# Patient Record
Sex: FEMALE | Race: WHITE | NOT HISPANIC OR LATINO | Employment: UNEMPLOYED | URBAN - NONMETROPOLITAN AREA
[De-identification: names, ages, dates, MRNs, and addresses within clinical notes are randomized per-mention and may not be internally consistent; named-entity substitution may affect disease eponyms.]

---

## 2020-08-16 ENCOUNTER — APPOINTMENT (OUTPATIENT)
Dept: CT IMAGING | Facility: HOSPITAL | Age: 55
End: 2020-08-16

## 2020-08-16 ENCOUNTER — APPOINTMENT (OUTPATIENT)
Dept: GENERAL RADIOLOGY | Facility: HOSPITAL | Age: 55
End: 2020-08-16

## 2020-08-16 ENCOUNTER — HOSPITAL ENCOUNTER (OUTPATIENT)
Facility: HOSPITAL | Age: 55
Setting detail: OBSERVATION
Discharge: NURSING, REHAB OR LONG-TERM CARE | End: 2020-08-20
Attending: STUDENT IN AN ORGANIZED HEALTH CARE EDUCATION/TRAINING PROGRAM | Admitting: HOSPITALIST
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
## 2020-08-16 DIAGNOSIS — R07.9 CHEST PAIN, UNSPECIFIED TYPE: Primary | ICD-10-CM

## 2020-08-16 LAB
ALBUMIN SERPL-MCNC: 4.03 G/DL (ref 3.5–5.2)
ALBUMIN/GLOB SERPL: 1.1 G/DL
ALP SERPL-CCNC: 165 U/L (ref 39–117)
ALT SERPL W P-5'-P-CCNC: 32 U/L (ref 1–33)
ANION GAP SERPL CALCULATED.3IONS-SCNC: 12.8 MMOL/L (ref 5–15)
APTT PPP: 28.7 SECONDS (ref 25.6–35.3)
AST SERPL-CCNC: 39 U/L (ref 1–32)
BASOPHILS # BLD AUTO: 0.07 10*3/MM3 (ref 0–0.2)
BASOPHILS NFR BLD AUTO: 0.6 % (ref 0–1.5)
BILIRUB SERPL-MCNC: 0.2 MG/DL (ref 0–1.2)
BUN SERPL-MCNC: 11 MG/DL (ref 6–20)
BUN/CREAT SERPL: 16.4 (ref 7–25)
CALCIUM SPEC-SCNC: 9.2 MG/DL (ref 8.6–10.5)
CHLORIDE SERPL-SCNC: 101 MMOL/L (ref 98–107)
CO2 SERPL-SCNC: 23.2 MMOL/L (ref 22–29)
CREAT SERPL-MCNC: 0.67 MG/DL (ref 0.57–1)
CRP SERPL-MCNC: 4.38 MG/DL (ref 0–0.5)
D-LACTATE SERPL-SCNC: 2.6 MMOL/L (ref 0.5–2)
DEPRECATED RDW RBC AUTO: 43.3 FL (ref 37–54)
EOSINOPHIL # BLD AUTO: 0.3 10*3/MM3 (ref 0–0.4)
EOSINOPHIL NFR BLD AUTO: 2.4 % (ref 0.3–6.2)
ERYTHROCYTE [DISTWIDTH] IN BLOOD BY AUTOMATED COUNT: 12.4 % (ref 12.3–15.4)
GFR SERPL CREATININE-BSD FRML MDRD: 91 ML/MIN/1.73
GLOBULIN UR ELPH-MCNC: 3.7 GM/DL
GLUCOSE SERPL-MCNC: 225 MG/DL (ref 65–99)
HCT VFR BLD AUTO: 43.4 % (ref 34–46.6)
HGB BLD-MCNC: 14.4 G/DL (ref 12–15.9)
HOLD SPECIMEN: NORMAL
IMM GRANULOCYTES # BLD AUTO: 0.12 10*3/MM3 (ref 0–0.05)
IMM GRANULOCYTES NFR BLD AUTO: 0.9 % (ref 0–0.5)
INR PPP: 1 (ref 0.9–1.1)
LYMPHOCYTES # BLD AUTO: 2.1 10*3/MM3 (ref 0.7–3.1)
LYMPHOCYTES NFR BLD AUTO: 16.6 % (ref 19.6–45.3)
MCH RBC QN AUTO: 32.1 PG (ref 26.6–33)
MCHC RBC AUTO-ENTMCNC: 33.2 G/DL (ref 31.5–35.7)
MCV RBC AUTO: 96.7 FL (ref 79–97)
MONOCYTES # BLD AUTO: 0.75 10*3/MM3 (ref 0.1–0.9)
MONOCYTES NFR BLD AUTO: 5.9 % (ref 5–12)
NEUTROPHILS NFR BLD AUTO: 73.6 % (ref 42.7–76)
NEUTROPHILS NFR BLD AUTO: 9.33 10*3/MM3 (ref 1.7–7)
NRBC BLD AUTO-RTO: 0 /100 WBC (ref 0–0.2)
NT-PROBNP SERPL-MCNC: 18.4 PG/ML (ref 0–900)
PHENYTOIN SERPL-MCNC: 17.2 MCG/ML (ref 10–20)
PLATELET # BLD AUTO: 208 10*3/MM3 (ref 140–450)
PMV BLD AUTO: 10.6 FL (ref 6–12)
POTASSIUM SERPL-SCNC: 4.6 MMOL/L (ref 3.5–5.2)
PROT SERPL-MCNC: 7.7 G/DL (ref 6–8.5)
PROTHROMBIN TIME: 13 SECONDS (ref 11.9–14.1)
RBC # BLD AUTO: 4.49 10*6/MM3 (ref 3.77–5.28)
SODIUM SERPL-SCNC: 137 MMOL/L (ref 136–145)
TROPONIN T SERPL-MCNC: <0.01 NG/ML (ref 0–0.03)
WBC # BLD AUTO: 12.67 10*3/MM3 (ref 3.4–10.8)
WHOLE BLOOD HOLD SPECIMEN: NORMAL
WHOLE BLOOD HOLD SPECIMEN: NORMAL

## 2020-08-16 PROCEDURE — 93010 ELECTROCARDIOGRAM REPORT: CPT | Performed by: INTERNAL MEDICINE

## 2020-08-16 PROCEDURE — 96361 HYDRATE IV INFUSION ADD-ON: CPT

## 2020-08-16 PROCEDURE — 80053 COMPREHEN METABOLIC PANEL: CPT | Performed by: STUDENT IN AN ORGANIZED HEALTH CARE EDUCATION/TRAINING PROGRAM

## 2020-08-16 PROCEDURE — 93005 ELECTROCARDIOGRAM TRACING: CPT | Performed by: STUDENT IN AN ORGANIZED HEALTH CARE EDUCATION/TRAINING PROGRAM

## 2020-08-16 PROCEDURE — 36415 COLL VENOUS BLD VENIPUNCTURE: CPT

## 2020-08-16 PROCEDURE — 25010000002 ONDANSETRON PER 1 MG: Performed by: STUDENT IN AN ORGANIZED HEALTH CARE EDUCATION/TRAINING PROGRAM

## 2020-08-16 PROCEDURE — 85025 COMPLETE CBC W/AUTO DIFF WBC: CPT | Performed by: STUDENT IN AN ORGANIZED HEALTH CARE EDUCATION/TRAINING PROGRAM

## 2020-08-16 PROCEDURE — 85379 FIBRIN DEGRADATION QUANT: CPT | Performed by: STUDENT IN AN ORGANIZED HEALTH CARE EDUCATION/TRAINING PROGRAM

## 2020-08-16 PROCEDURE — 86140 C-REACTIVE PROTEIN: CPT | Performed by: STUDENT IN AN ORGANIZED HEALTH CARE EDUCATION/TRAINING PROGRAM

## 2020-08-16 PROCEDURE — 83605 ASSAY OF LACTIC ACID: CPT | Performed by: STUDENT IN AN ORGANIZED HEALTH CARE EDUCATION/TRAINING PROGRAM

## 2020-08-16 PROCEDURE — 72125 CT NECK SPINE W/O DYE: CPT

## 2020-08-16 PROCEDURE — 80185 ASSAY OF PHENYTOIN TOTAL: CPT | Performed by: STUDENT IN AN ORGANIZED HEALTH CARE EDUCATION/TRAINING PROGRAM

## 2020-08-16 PROCEDURE — 0 IOVERSOL 68 % SOLUTION: Performed by: STUDENT IN AN ORGANIZED HEALTH CARE EDUCATION/TRAINING PROGRAM

## 2020-08-16 PROCEDURE — 71045 X-RAY EXAM CHEST 1 VIEW: CPT

## 2020-08-16 PROCEDURE — 99285 EMERGENCY DEPT VISIT HI MDM: CPT

## 2020-08-16 PROCEDURE — 70450 CT HEAD/BRAIN W/O DYE: CPT

## 2020-08-16 PROCEDURE — 87040 BLOOD CULTURE FOR BACTERIA: CPT | Performed by: STUDENT IN AN ORGANIZED HEALTH CARE EDUCATION/TRAINING PROGRAM

## 2020-08-16 PROCEDURE — 85730 THROMBOPLASTIN TIME PARTIAL: CPT | Performed by: STUDENT IN AN ORGANIZED HEALTH CARE EDUCATION/TRAINING PROGRAM

## 2020-08-16 PROCEDURE — 96375 TX/PRO/DX INJ NEW DRUG ADDON: CPT

## 2020-08-16 PROCEDURE — 83880 ASSAY OF NATRIURETIC PEPTIDE: CPT | Performed by: STUDENT IN AN ORGANIZED HEALTH CARE EDUCATION/TRAINING PROGRAM

## 2020-08-16 PROCEDURE — 84484 ASSAY OF TROPONIN QUANT: CPT | Performed by: STUDENT IN AN ORGANIZED HEALTH CARE EDUCATION/TRAINING PROGRAM

## 2020-08-16 PROCEDURE — 74160 CT ABDOMEN W/CONTRAST: CPT

## 2020-08-16 PROCEDURE — 85610 PROTHROMBIN TIME: CPT | Performed by: STUDENT IN AN ORGANIZED HEALTH CARE EDUCATION/TRAINING PROGRAM

## 2020-08-16 RX ORDER — ONDANSETRON 2 MG/ML
4 INJECTION INTRAMUSCULAR; INTRAVENOUS ONCE
Status: COMPLETED | OUTPATIENT
Start: 2020-08-16 | End: 2020-08-16

## 2020-08-16 RX ORDER — ONDANSETRON 4 MG/1
4 TABLET, ORALLY DISINTEGRATING ORAL ONCE
Status: DISCONTINUED | OUTPATIENT
Start: 2020-08-16 | End: 2020-08-16

## 2020-08-16 RX ORDER — SODIUM CHLORIDE 0.9 % (FLUSH) 0.9 %
10 SYRINGE (ML) INJECTION AS NEEDED
Status: DISCONTINUED | OUTPATIENT
Start: 2020-08-16 | End: 2020-08-20 | Stop reason: HOSPADM

## 2020-08-16 RX ORDER — ASPIRIN 81 MG/1
324 TABLET, CHEWABLE ORAL ONCE
Status: COMPLETED | OUTPATIENT
Start: 2020-08-16 | End: 2020-08-16

## 2020-08-16 RX ADMIN — ASPIRIN 324 MG: 81 TABLET, CHEWABLE ORAL at 21:53

## 2020-08-16 RX ADMIN — ONDANSETRON 4 MG: 2 INJECTION INTRAMUSCULAR; INTRAVENOUS at 22:30

## 2020-08-16 RX ADMIN — IOVERSOL 90 ML: 678 INJECTION INTRA-ARTERIAL; INTRAVENOUS at 23:32

## 2020-08-16 RX ADMIN — SODIUM CHLORIDE 1000 ML: 9 INJECTION, SOLUTION INTRAVENOUS at 23:50

## 2020-08-17 ENCOUNTER — APPOINTMENT (OUTPATIENT)
Dept: NUCLEAR MEDICINE | Facility: HOSPITAL | Age: 55
End: 2020-08-17

## 2020-08-17 ENCOUNTER — APPOINTMENT (OUTPATIENT)
Dept: CARDIOLOGY | Facility: HOSPITAL | Age: 55
End: 2020-08-17

## 2020-08-17 PROBLEM — R07.9 CHEST PAIN: Status: ACTIVE | Noted: 2020-08-17

## 2020-08-17 PROBLEM — F79 INTELLECTUAL DISABILITY: Chronic | Status: ACTIVE | Noted: 2020-08-17

## 2020-08-17 PROBLEM — Z86.73 HISTORY OF CVA (CEREBROVASCULAR ACCIDENT): Chronic | Status: ACTIVE | Noted: 2020-08-17

## 2020-08-17 PROBLEM — Z87.898 HISTORY OF SEIZURES: Chronic | Status: ACTIVE | Noted: 2020-08-17

## 2020-08-17 LAB
6-ACETYL MORPHINE: NEGATIVE
ALBUMIN SERPL-MCNC: 4 G/DL (ref 3.5–5.2)
ALBUMIN/GLOB SERPL: 1.3 G/DL
ALP SERPL-CCNC: 158 U/L (ref 39–117)
ALT SERPL W P-5'-P-CCNC: 30 U/L (ref 1–33)
AMPHET+METHAMPHET UR QL: NEGATIVE
ANION GAP SERPL CALCULATED.3IONS-SCNC: 10.5 MMOL/L (ref 5–15)
AST SERPL-CCNC: 20 U/L (ref 1–32)
BACTERIA UR QL AUTO: ABNORMAL /HPF
BARBITURATES UR QL SCN: NEGATIVE
BASOPHILS # BLD AUTO: 0.07 10*3/MM3 (ref 0–0.2)
BASOPHILS NFR BLD AUTO: 0.7 % (ref 0–1.5)
BENZODIAZ UR QL SCN: POSITIVE
BH CV ECHO MEAS - % IVS THICK: 1.1 %
BH CV ECHO MEAS - % LVPW THICK: 13.1 %
BH CV ECHO MEAS - ACS: 2.7 CM
BH CV ECHO MEAS - AO MAX PG: 8.5 MMHG
BH CV ECHO MEAS - AO MEAN PG: 4 MMHG
BH CV ECHO MEAS - AO ROOT AREA (BSA CORRECTED): 1.9
BH CV ECHO MEAS - AO ROOT AREA: 10.2 CM^2
BH CV ECHO MEAS - AO ROOT DIAM: 3.6 CM
BH CV ECHO MEAS - AO V2 MAX: 146 CM/SEC
BH CV ECHO MEAS - AO V2 MEAN: 95.1 CM/SEC
BH CV ECHO MEAS - AO V2 VTI: 28.3 CM
BH CV ECHO MEAS - BSA(HAYCOCK): 2 M^2
BH CV ECHO MEAS - BSA: 1.9 M^2
BH CV ECHO MEAS - BZI_BMI: 38.9 KILOGRAMS/M^2
BH CV ECHO MEAS - BZI_METRIC_HEIGHT: 152.4 CM
BH CV ECHO MEAS - BZI_METRIC_WEIGHT: 90.3 KG
BH CV ECHO MEAS - EDV(CUBED): 48 ML
BH CV ECHO MEAS - EDV(MOD-SP4): 36.8 ML
BH CV ECHO MEAS - EDV(TEICH): 55.7 ML
BH CV ECHO MEAS - EF(CUBED): 65.9 %
BH CV ECHO MEAS - EF(MOD-SP4): 64.4 %
BH CV ECHO MEAS - EF(TEICH): 58.3 %
BH CV ECHO MEAS - ESV(CUBED): 16.4 ML
BH CV ECHO MEAS - ESV(MOD-SP4): 13.1 ML
BH CV ECHO MEAS - ESV(TEICH): 23.2 ML
BH CV ECHO MEAS - FS: 30.1 %
BH CV ECHO MEAS - IVS/LVPW: 1.1
BH CV ECHO MEAS - IVSD: 1.1 CM
BH CV ECHO MEAS - IVSS: 1.1 CM
BH CV ECHO MEAS - LA DIMENSION: 3 CM
BH CV ECHO MEAS - LA/AO: 0.83
BH CV ECHO MEAS - LV DIASTOLIC VOL/BSA (35-75): 19.8 ML/M^2
BH CV ECHO MEAS - LV MASS(C)D: 115.7 GRAMS
BH CV ECHO MEAS - LV MASS(C)DI: 62.1 GRAMS/M^2
BH CV ECHO MEAS - LV MASS(C)S: 76.7 GRAMS
BH CV ECHO MEAS - LV MASS(C)SI: 41.2 GRAMS/M^2
BH CV ECHO MEAS - LV SYSTOLIC VOL/BSA (12-30): 7 ML/M^2
BH CV ECHO MEAS - LVIDD: 3.6 CM
BH CV ECHO MEAS - LVIDS: 2.5 CM
BH CV ECHO MEAS - LVLD AP4: 6.4 CM
BH CV ECHO MEAS - LVLS AP4: 5.6 CM
BH CV ECHO MEAS - LVOT AREA (M): 3.5 CM^2
BH CV ECHO MEAS - LVOT AREA: 3.5 CM^2
BH CV ECHO MEAS - LVOT DIAM: 2.1 CM
BH CV ECHO MEAS - LVPWD: 1 CM
BH CV ECHO MEAS - LVPWS: 1.1 CM
BH CV ECHO MEAS - MV A MAX VEL: 113 CM/SEC
BH CV ECHO MEAS - MV E MAX VEL: 68.6 CM/SEC
BH CV ECHO MEAS - MV E/A: 0.61
BH CV ECHO MEAS - PA ACC TIME: 0.09 SEC
BH CV ECHO MEAS - PA PR(ACCEL): 37.6 MMHG
BH CV ECHO MEAS - SI(AO): 154.7 ML/M^2
BH CV ECHO MEAS - SI(CUBED): 17 ML/M^2
BH CV ECHO MEAS - SI(MOD-SP4): 12.7 ML/M^2
BH CV ECHO MEAS - SI(TEICH): 17.4 ML/M^2
BH CV ECHO MEAS - SV(AO): 288.1 ML
BH CV ECHO MEAS - SV(CUBED): 31.6 ML
BH CV ECHO MEAS - SV(MOD-SP4): 23.7 ML
BH CV ECHO MEAS - SV(TEICH): 32.5 ML
BILIRUB SERPL-MCNC: 0.3 MG/DL (ref 0–1.2)
BILIRUB UR QL STRIP: NEGATIVE
BUN SERPL-MCNC: 9 MG/DL (ref 6–20)
BUN/CREAT SERPL: 16.4 (ref 7–25)
BUPRENORPHINE SERPL-MCNC: NEGATIVE NG/ML
CALCIUM SPEC-SCNC: 8.8 MG/DL (ref 8.6–10.5)
CANNABINOIDS SERPL QL: NEGATIVE
CHLORIDE SERPL-SCNC: 106 MMOL/L (ref 98–107)
CHOLEST SERPL-MCNC: 164 MG/DL (ref 0–200)
CLARITY UR: ABNORMAL
CO2 SERPL-SCNC: 24.5 MMOL/L (ref 22–29)
COCAINE UR QL: NEGATIVE
COLOR UR: YELLOW
CREAT SERPL-MCNC: 0.55 MG/DL (ref 0.57–1)
CRP SERPL-MCNC: 3.76 MG/DL (ref 0–0.5)
D DIMER PPP FEU-MCNC: 0.34 MCGFEU/ML (ref 0–0.5)
D-LACTATE SERPL-SCNC: 1.2 MMOL/L (ref 0.5–2)
DEPRECATED RDW RBC AUTO: 45.7 FL (ref 37–54)
EOSINOPHIL # BLD AUTO: 0.29 10*3/MM3 (ref 0–0.4)
EOSINOPHIL NFR BLD AUTO: 2.9 % (ref 0.3–6.2)
ERYTHROCYTE [DISTWIDTH] IN BLOOD BY AUTOMATED COUNT: 12.5 % (ref 12.3–15.4)
GFR SERPL CREATININE-BSD FRML MDRD: 115 ML/MIN/1.73
GLOBULIN UR ELPH-MCNC: 3.2 GM/DL
GLUCOSE SERPL-MCNC: 166 MG/DL (ref 65–99)
GLUCOSE UR STRIP-MCNC: NEGATIVE MG/DL
HBA1C MFR BLD: 6.8 % (ref 4.8–5.6)
HCT VFR BLD AUTO: 43.5 % (ref 34–46.6)
HDLC SERPL-MCNC: 59 MG/DL (ref 40–60)
HGB BLD-MCNC: 14 G/DL (ref 12–15.9)
HGB UR QL STRIP.AUTO: NEGATIVE
HYALINE CASTS UR QL AUTO: ABNORMAL /LPF
IMM GRANULOCYTES # BLD AUTO: 0.08 10*3/MM3 (ref 0–0.05)
IMM GRANULOCYTES NFR BLD AUTO: 0.8 % (ref 0–0.5)
KETONES UR QL STRIP: NEGATIVE
LACTATE HOLD SPECIMEN: NORMAL
LDLC SERPL CALC-MCNC: 83 MG/DL (ref 0–100)
LDLC/HDLC SERPL: 1.41 {RATIO}
LEUKOCYTE ESTERASE UR QL STRIP.AUTO: ABNORMAL
LYMPHOCYTES # BLD AUTO: 2.55 10*3/MM3 (ref 0.7–3.1)
LYMPHOCYTES NFR BLD AUTO: 25.7 % (ref 19.6–45.3)
MAGNESIUM SERPL-MCNC: 1.9 MG/DL (ref 1.6–2.6)
MAXIMAL PREDICTED HEART RATE: 165 BPM
MCH RBC QN AUTO: 32.1 PG (ref 26.6–33)
MCHC RBC AUTO-ENTMCNC: 32.2 G/DL (ref 31.5–35.7)
MCV RBC AUTO: 99.8 FL (ref 79–97)
METHADONE UR QL SCN: NEGATIVE
MONOCYTES # BLD AUTO: 0.76 10*3/MM3 (ref 0.1–0.9)
MONOCYTES NFR BLD AUTO: 7.7 % (ref 5–12)
NEUTROPHILS NFR BLD AUTO: 6.18 10*3/MM3 (ref 1.7–7)
NEUTROPHILS NFR BLD AUTO: 62.2 % (ref 42.7–76)
NITRITE UR QL STRIP: NEGATIVE
NRBC BLD AUTO-RTO: 0 /100 WBC (ref 0–0.2)
OPIATES UR QL: NEGATIVE
OXYCODONE UR QL SCN: NEGATIVE
PCP UR QL SCN: NEGATIVE
PH UR STRIP.AUTO: 8 [PH] (ref 5–8)
PHOSPHATE SERPL-MCNC: 3.3 MG/DL (ref 2.5–4.5)
PLATELET # BLD AUTO: 194 10*3/MM3 (ref 140–450)
PMV BLD AUTO: 10.9 FL (ref 6–12)
POTASSIUM SERPL-SCNC: 3.9 MMOL/L (ref 3.5–5.2)
PROT SERPL-MCNC: 7.2 G/DL (ref 6–8.5)
PROT UR QL STRIP: NEGATIVE
RBC # BLD AUTO: 4.36 10*6/MM3 (ref 3.77–5.28)
RBC # UR: ABNORMAL /HPF
REF LAB TEST METHOD: ABNORMAL
SODIUM SERPL-SCNC: 141 MMOL/L (ref 136–145)
SP GR UR STRIP: 1.01 (ref 1–1.03)
SQUAMOUS #/AREA URNS HPF: ABNORMAL /HPF
STRESS TARGET HR: 140 BPM
T4 FREE SERPL-MCNC: 1.18 NG/DL (ref 0.93–1.7)
TRIGL SERPL-MCNC: 110 MG/DL (ref 0–150)
TROPONIN T SERPL-MCNC: <0.01 NG/ML (ref 0–0.03)
TSH SERPL DL<=0.05 MIU/L-ACNC: 4.24 UIU/ML (ref 0.27–4.2)
UROBILINOGEN UR QL STRIP: ABNORMAL
VLDLC SERPL-MCNC: 22 MG/DL
WBC # BLD AUTO: 9.93 10*3/MM3 (ref 3.4–10.8)
WBC CLUMPS # UR AUTO: ABNORMAL /HPF
WBC UR QL AUTO: ABNORMAL /HPF

## 2020-08-17 PROCEDURE — 80307 DRUG TEST PRSMV CHEM ANLYZR: CPT | Performed by: PHYSICIAN ASSISTANT

## 2020-08-17 PROCEDURE — G0378 HOSPITAL OBSERVATION PER HR: HCPCS

## 2020-08-17 PROCEDURE — 83735 ASSAY OF MAGNESIUM: CPT | Performed by: PHYSICIAN ASSISTANT

## 2020-08-17 PROCEDURE — 93017 CV STRESS TEST TRACING ONLY: CPT

## 2020-08-17 PROCEDURE — 83605 ASSAY OF LACTIC ACID: CPT | Performed by: STUDENT IN AN ORGANIZED HEALTH CARE EDUCATION/TRAINING PROGRAM

## 2020-08-17 PROCEDURE — 93306 TTE W/DOPPLER COMPLETE: CPT | Performed by: INTERNAL MEDICINE

## 2020-08-17 PROCEDURE — 87077 CULTURE AEROBIC IDENTIFY: CPT | Performed by: HOSPITALIST

## 2020-08-17 PROCEDURE — 97162 PT EVAL MOD COMPLEX 30 MIN: CPT

## 2020-08-17 PROCEDURE — 93306 TTE W/DOPPLER COMPLETE: CPT

## 2020-08-17 PROCEDURE — 78452 HT MUSCLE IMAGE SPECT MULT: CPT | Performed by: INTERNAL MEDICINE

## 2020-08-17 PROCEDURE — 80061 LIPID PANEL: CPT | Performed by: INTERNAL MEDICINE

## 2020-08-17 PROCEDURE — 83036 HEMOGLOBIN GLYCOSYLATED A1C: CPT | Performed by: INTERNAL MEDICINE

## 2020-08-17 PROCEDURE — 84100 ASSAY OF PHOSPHORUS: CPT | Performed by: PHYSICIAN ASSISTANT

## 2020-08-17 PROCEDURE — A9500 TC99M SESTAMIBI: HCPCS | Performed by: HOSPITALIST

## 2020-08-17 PROCEDURE — 81001 URINALYSIS AUTO W/SCOPE: CPT | Performed by: HOSPITALIST

## 2020-08-17 PROCEDURE — 80053 COMPREHEN METABOLIC PANEL: CPT | Performed by: INTERNAL MEDICINE

## 2020-08-17 PROCEDURE — 96367 TX/PROPH/DG ADDL SEQ IV INF: CPT

## 2020-08-17 PROCEDURE — 93010 ELECTROCARDIOGRAM REPORT: CPT | Performed by: INTERNAL MEDICINE

## 2020-08-17 PROCEDURE — 0 TECHNETIUM SESTAMIBI: Performed by: HOSPITALIST

## 2020-08-17 PROCEDURE — 84484 ASSAY OF TROPONIN QUANT: CPT | Performed by: STUDENT IN AN ORGANIZED HEALTH CARE EDUCATION/TRAINING PROGRAM

## 2020-08-17 PROCEDURE — 86140 C-REACTIVE PROTEIN: CPT | Performed by: PHYSICIAN ASSISTANT

## 2020-08-17 PROCEDURE — 99220 PR INITIAL OBSERVATION CARE/DAY 70 MINUTES: CPT | Performed by: PHYSICIAN ASSISTANT

## 2020-08-17 PROCEDURE — 85025 COMPLETE CBC W/AUTO DIFF WBC: CPT | Performed by: INTERNAL MEDICINE

## 2020-08-17 PROCEDURE — 25010000002 REGADENOSON 0.4 MG/5ML SOLUTION: Performed by: INTERNAL MEDICINE

## 2020-08-17 PROCEDURE — 84443 ASSAY THYROID STIM HORMONE: CPT | Performed by: PHYSICIAN ASSISTANT

## 2020-08-17 PROCEDURE — 84484 ASSAY OF TROPONIN QUANT: CPT | Performed by: INTERNAL MEDICINE

## 2020-08-17 PROCEDURE — 25010000002 CEFTRIAXONE PER 250 MG: Performed by: HOSPITALIST

## 2020-08-17 PROCEDURE — 87086 URINE CULTURE/COLONY COUNT: CPT | Performed by: HOSPITALIST

## 2020-08-17 PROCEDURE — 78452 HT MUSCLE IMAGE SPECT MULT: CPT

## 2020-08-17 PROCEDURE — 25010000002 MAGNESIUM SULFATE IN D5W 1G/100ML (PREMIX) 1-5 GM/100ML-% SOLUTION: Performed by: HOSPITALIST

## 2020-08-17 PROCEDURE — 93018 CV STRESS TEST I&R ONLY: CPT | Performed by: INTERNAL MEDICINE

## 2020-08-17 PROCEDURE — 87186 SC STD MICRODIL/AGAR DIL: CPT | Performed by: HOSPITALIST

## 2020-08-17 PROCEDURE — 93005 ELECTROCARDIOGRAM TRACING: CPT | Performed by: INTERNAL MEDICINE

## 2020-08-17 PROCEDURE — 84439 ASSAY OF FREE THYROXINE: CPT | Performed by: HOSPITALIST

## 2020-08-17 PROCEDURE — 96361 HYDRATE IV INFUSION ADD-ON: CPT

## 2020-08-17 PROCEDURE — 96365 THER/PROPH/DIAG IV INF INIT: CPT

## 2020-08-17 PROCEDURE — 97166 OT EVAL MOD COMPLEX 45 MIN: CPT

## 2020-08-17 RX ORDER — RUFINAMIDE 400 MG/1
400 TABLET, FILM COATED ORAL 2 TIMES DAILY
Status: DISCONTINUED | OUTPATIENT
Start: 2020-08-17 | End: 2020-08-17 | Stop reason: ALTCHOICE

## 2020-08-17 RX ORDER — RAMIPRIL 5 MG/1
5 CAPSULE ORAL NIGHTLY
COMMUNITY
End: 2020-08-20 | Stop reason: HOSPADM

## 2020-08-17 RX ORDER — QUETIAPINE FUMARATE 100 MG/1
200 TABLET, FILM COATED ORAL NIGHTLY
Status: DISCONTINUED | OUTPATIENT
Start: 2020-08-17 | End: 2020-08-20 | Stop reason: HOSPADM

## 2020-08-17 RX ORDER — BISMUTH SUBSALICYLATE 262 MG/1
524 TABLET, CHEWABLE ORAL
Status: DISCONTINUED | OUTPATIENT
Start: 2020-08-17 | End: 2020-08-20 | Stop reason: HOSPADM

## 2020-08-17 RX ORDER — NITROGLYCERIN 0.4 MG/1
0.4 TABLET SUBLINGUAL
Status: DISCONTINUED | OUTPATIENT
Start: 2020-08-17 | End: 2020-08-20 | Stop reason: HOSPADM

## 2020-08-17 RX ORDER — POTASSIUM CHLORIDE 20 MEQ/1
20 TABLET, EXTENDED RELEASE ORAL 2 TIMES DAILY
Status: ON HOLD | COMMUNITY
End: 2020-08-20 | Stop reason: SDUPTHER

## 2020-08-17 RX ORDER — LOPERAMIDE HYDROCHLORIDE 2 MG/1
2 CAPSULE ORAL EVERY 6 HOURS PRN
Status: DISCONTINUED | OUTPATIENT
Start: 2020-08-17 | End: 2020-08-20 | Stop reason: HOSPADM

## 2020-08-17 RX ORDER — DIAZEPAM 5 MG/1
5 TABLET ORAL 2 TIMES DAILY
Status: DISCONTINUED | OUTPATIENT
Start: 2020-08-17 | End: 2020-08-20 | Stop reason: HOSPADM

## 2020-08-17 RX ORDER — QUETIAPINE FUMARATE 100 MG/1
100 TABLET, FILM COATED ORAL 2 TIMES DAILY
Status: DISCONTINUED | OUTPATIENT
Start: 2020-08-17 | End: 2020-08-20 | Stop reason: HOSPADM

## 2020-08-17 RX ORDER — L.ACID,PARA/B.BIFIDUM/S.THERM 8B CELL
1 CAPSULE ORAL DAILY
Status: DISCONTINUED | OUTPATIENT
Start: 2020-08-18 | End: 2020-08-20 | Stop reason: HOSPADM

## 2020-08-17 RX ORDER — ASPIRIN 81 MG/1
81 TABLET, CHEWABLE ORAL DAILY
Status: DISCONTINUED | OUTPATIENT
Start: 2020-08-17 | End: 2020-08-20 | Stop reason: HOSPADM

## 2020-08-17 RX ORDER — LOPERAMIDE HYDROCHLORIDE 2 MG/1
2 CAPSULE ORAL EVERY 6 HOURS PRN
COMMUNITY

## 2020-08-17 RX ORDER — IBUPROFEN 400 MG/1
400 TABLET ORAL EVERY 8 HOURS PRN
Status: CANCELLED | OUTPATIENT
Start: 2020-08-17

## 2020-08-17 RX ORDER — PAROXETINE 30 MG/1
30 TABLET, FILM COATED ORAL DAILY
Status: DISCONTINUED | OUTPATIENT
Start: 2020-08-17 | End: 2020-08-20 | Stop reason: HOSPADM

## 2020-08-17 RX ORDER — SODIUM CHLORIDE 9 MG/ML
75 INJECTION, SOLUTION INTRAVENOUS CONTINUOUS
Status: DISCONTINUED | OUTPATIENT
Start: 2020-08-17 | End: 2020-08-18

## 2020-08-17 RX ORDER — ONDANSETRON 4 MG/1
4 TABLET, FILM COATED ORAL EVERY 6 HOURS PRN
COMMUNITY

## 2020-08-17 RX ORDER — DIAZEPAM 5 MG/1
5 TABLET ORAL 2 TIMES DAILY
COMMUNITY

## 2020-08-17 RX ORDER — PAROXETINE 30 MG/1
30 TABLET, FILM COATED ORAL DAILY
COMMUNITY

## 2020-08-17 RX ORDER — ACETAMINOPHEN 325 MG/1
650 TABLET ORAL EVERY 4 HOURS PRN
COMMUNITY

## 2020-08-17 RX ORDER — PHENYTOIN SODIUM 100 MG/1
200 CAPSULE, EXTENDED RELEASE ORAL NIGHTLY
Status: DISCONTINUED | OUTPATIENT
Start: 2020-08-17 | End: 2020-08-20 | Stop reason: HOSPADM

## 2020-08-17 RX ORDER — SODIUM CHLORIDE 0.9 % (FLUSH) 0.9 %
10 SYRINGE (ML) INJECTION AS NEEDED
Status: DISCONTINUED | OUTPATIENT
Start: 2020-08-17 | End: 2020-08-20 | Stop reason: HOSPADM

## 2020-08-17 RX ORDER — BISACODYL 5 MG/1
10 TABLET, DELAYED RELEASE ORAL DAILY PRN
COMMUNITY

## 2020-08-17 RX ORDER — FUROSEMIDE 20 MG/1
20 TABLET ORAL DAILY
COMMUNITY
End: 2020-08-20 | Stop reason: HOSPADM

## 2020-08-17 RX ORDER — FUROSEMIDE 20 MG/1
20 TABLET ORAL DAILY
Status: DISCONTINUED | OUTPATIENT
Start: 2020-08-17 | End: 2020-08-18

## 2020-08-17 RX ORDER — FUROSEMIDE 20 MG/1
20 TABLET ORAL DAILY
Status: CANCELLED | OUTPATIENT
Start: 2020-08-17

## 2020-08-17 RX ORDER — POTASSIUM CHLORIDE 20 MEQ/1
20 TABLET, EXTENDED RELEASE ORAL 2 TIMES DAILY
Status: CANCELLED | OUTPATIENT
Start: 2020-08-17

## 2020-08-17 RX ORDER — RUFINAMIDE 400 MG/1
400 TABLET, FILM COATED ORAL 2 TIMES DAILY
COMMUNITY

## 2020-08-17 RX ORDER — PHENYTOIN SODIUM 100 MG/1
200 CAPSULE, EXTENDED RELEASE ORAL NIGHTLY
COMMUNITY

## 2020-08-17 RX ORDER — SODIUM CHLORIDE 0.9 % (FLUSH) 0.9 %
10 SYRINGE (ML) INJECTION EVERY 12 HOURS SCHEDULED
Status: DISCONTINUED | OUTPATIENT
Start: 2020-08-17 | End: 2020-08-20 | Stop reason: HOSPADM

## 2020-08-17 RX ORDER — ERGOCALCIFEROL 1.25 MG/1
50000 CAPSULE ORAL WEEKLY
COMMUNITY

## 2020-08-17 RX ORDER — MAGNESIUM SULFATE 1 G/100ML
1 INJECTION INTRAVENOUS ONCE
Status: COMPLETED | OUTPATIENT
Start: 2020-08-17 | End: 2020-08-17

## 2020-08-17 RX ORDER — TOPIRAMATE 100 MG/1
200 TABLET, FILM COATED ORAL 2 TIMES DAILY
Status: DISCONTINUED | OUTPATIENT
Start: 2020-08-17 | End: 2020-08-20 | Stop reason: HOSPADM

## 2020-08-17 RX ORDER — QUETIAPINE FUMARATE 100 MG/1
200 TABLET, FILM COATED ORAL NIGHTLY
COMMUNITY

## 2020-08-17 RX ORDER — GABAPENTIN 300 MG/1
300 CAPSULE ORAL NIGHTLY
COMMUNITY

## 2020-08-17 RX ORDER — QUETIAPINE FUMARATE 100 MG/1
100 TABLET, FILM COATED ORAL 2 TIMES DAILY
COMMUNITY

## 2020-08-17 RX ORDER — RAMIPRIL 2.5 MG/1
2.5 CAPSULE ORAL DAILY
COMMUNITY
End: 2020-08-20 | Stop reason: HOSPADM

## 2020-08-17 RX ORDER — RUFINAMIDE 400 MG/1
400 TABLET, FILM COATED ORAL 2 TIMES DAILY
Status: DISCONTINUED | OUTPATIENT
Start: 2020-08-17 | End: 2020-08-20 | Stop reason: HOSPADM

## 2020-08-17 RX ORDER — BISACODYL 5 MG/1
10 TABLET, DELAYED RELEASE ORAL DAILY PRN
Status: DISCONTINUED | OUTPATIENT
Start: 2020-08-17 | End: 2020-08-20 | Stop reason: HOSPADM

## 2020-08-17 RX ORDER — ONDANSETRON 4 MG/1
4 TABLET, FILM COATED ORAL EVERY 6 HOURS PRN
Status: CANCELLED | OUTPATIENT
Start: 2020-08-17

## 2020-08-17 RX ORDER — RAMIPRIL 2.5 MG/1
2.5 CAPSULE ORAL DAILY
Status: DISCONTINUED | OUTPATIENT
Start: 2020-08-17 | End: 2020-08-19

## 2020-08-17 RX ORDER — ACETAMINOPHEN 325 MG/1
650 TABLET ORAL EVERY 4 HOURS PRN
Status: CANCELLED | OUTPATIENT
Start: 2020-08-17

## 2020-08-17 RX ORDER — RAMIPRIL 2.5 MG/1
5 CAPSULE ORAL NIGHTLY
Status: DISCONTINUED | OUTPATIENT
Start: 2020-08-17 | End: 2020-08-20 | Stop reason: HOSPADM

## 2020-08-17 RX ORDER — GABAPENTIN 300 MG/1
300 CAPSULE ORAL NIGHTLY
Status: DISCONTINUED | OUTPATIENT
Start: 2020-08-17 | End: 2020-08-20 | Stop reason: HOSPADM

## 2020-08-17 RX ORDER — ACETAMINOPHEN 325 MG/1
650 TABLET ORAL EVERY 6 HOURS PRN
Status: DISCONTINUED | OUTPATIENT
Start: 2020-08-17 | End: 2020-08-20 | Stop reason: HOSPADM

## 2020-08-17 RX ORDER — IBUPROFEN 400 MG/1
400 TABLET ORAL EVERY 8 HOURS PRN
COMMUNITY
End: 2020-08-20 | Stop reason: HOSPADM

## 2020-08-17 RX ADMIN — MAGNESIUM SULFATE IN DEXTROSE 1 G: 10 INJECTION, SOLUTION INTRAVENOUS at 15:57

## 2020-08-17 RX ADMIN — RUFINAMIDE 400 MG: 400 TABLET, FILM COATED ORAL at 21:31

## 2020-08-17 RX ADMIN — SODIUM CHLORIDE 75 ML/HR: 9 INJECTION, SOLUTION INTRAVENOUS at 06:45

## 2020-08-17 RX ADMIN — SODIUM CHLORIDE 75 ML/HR: 9 INJECTION, SOLUTION INTRAVENOUS at 23:03

## 2020-08-17 RX ADMIN — CEFTRIAXONE 2 G: 2 INJECTION, POWDER, FOR SOLUTION INTRAMUSCULAR; INTRAVENOUS at 23:03

## 2020-08-17 RX ADMIN — RAMIPRIL 2.5 MG: 2.5 CAPSULE ORAL at 09:53

## 2020-08-17 RX ADMIN — DIAZEPAM 5 MG: 5 TABLET ORAL at 09:53

## 2020-08-17 RX ADMIN — DIAZEPAM 5 MG: 5 TABLET ORAL at 21:30

## 2020-08-17 RX ADMIN — ASPIRIN 81 MG: 81 TABLET, CHEWABLE ORAL at 09:51

## 2020-08-17 RX ADMIN — TOPIRAMATE 200 MG: 100 TABLET, FILM COATED ORAL at 09:54

## 2020-08-17 RX ADMIN — SODIUM CHLORIDE, PRESERVATIVE FREE 10 ML: 5 INJECTION INTRAVENOUS at 21:31

## 2020-08-17 RX ADMIN — PHENYTOIN SODIUM 200 MG: 100 CAPSULE, EXTENDED RELEASE ORAL at 21:31

## 2020-08-17 RX ADMIN — TOPIRAMATE 200 MG: 100 TABLET, FILM COATED ORAL at 21:30

## 2020-08-17 RX ADMIN — TECHNETIUM TC 99M SESTAMIBI 1 DOSE: 1 INJECTION INTRAVENOUS at 09:25

## 2020-08-17 RX ADMIN — QUETIAPINE FUMARATE 200 MG: 100 TABLET ORAL at 21:31

## 2020-08-17 RX ADMIN — GABAPENTIN 300 MG: 300 CAPSULE ORAL at 21:30

## 2020-08-17 RX ADMIN — RAMIPRIL 5 MG: 2.5 CAPSULE ORAL at 21:30

## 2020-08-17 RX ADMIN — QUETIAPINE FUMARATE 100 MG: 100 TABLET ORAL at 09:53

## 2020-08-17 RX ADMIN — PAROXETINE HYDROCHLORIDE 30 MG: 30 TABLET, FILM COATED ORAL at 09:54

## 2020-08-17 RX ADMIN — FUROSEMIDE 20 MG: 20 TABLET ORAL at 09:54

## 2020-08-17 RX ADMIN — SODIUM CHLORIDE 75 ML/HR: 9 INJECTION, SOLUTION INTRAVENOUS at 15:57

## 2020-08-17 RX ADMIN — REGADENOSON 0.4 MG: 0.08 INJECTION, SOLUTION INTRAVENOUS at 13:07

## 2020-08-17 NOTE — ED NOTES
Spoke with Radhika WERNER, at this time. He gave me permission to treat and/or admit patient. He would like to be contacted with updates [phone].     Keven Haro RN  08/16/20 9526

## 2020-08-17 NOTE — PROGRESS NOTES
[facility] HOSPITALIST PROGRESS NOTE     Patient Identification:  Name:  Marguerite Brush  Age:  55 y.o.  Sex:  female  :  1965  MRN:  [id number]  Visit Number:  [id number]  ROOM: [address] [place] [address]     Primary Care Provider:  Alonso Mendez MD    Length of stay:  0    Subjective        Chief Compliant Follow up for the chest pain    Patient seen and examined this afternoon with no family at bedside.  Had stress test this morning.  Currently denies any chest pain.  Is able to answer in yes and no.  Denies any headache, abdominal pain.  Afebrile no events overnight.  On room air      Objective     Current Hospital Meds:  aspirin 81 mg Oral Daily   [START ON 2020] cholecalciferol 50,000 Units Oral Weekly   diazePAM 5 mg Oral BID   furosemide 20 mg Oral Daily   gabapentin 300 mg Oral Nightly   PARoxetine 30 mg Oral Daily   phenytoin 200 mg Oral Nightly   QUEtiapine 100 mg Oral BID   QUEtiapine 200 mg Oral Nightly   ramipril 2.5 mg Oral Daily   ramipril 5 mg Oral Nightly   rufinamide 400 mg Oral BID   sodium chloride 10 mL Intravenous Q12H   topiramate 200 mg Oral BID     sodium chloride 75 mL/hr Last Rate: 75 mL/hr (20 0645)     ----------------------------------------------------------------------------------------------------------------------  Vital Signs:  Temp:  [98.3 °F (36.8 °C)-98.9 °F (37.2 °C)] 98.6 °F (37 °C)  Heart Rate:  [] 95  Resp:  [18] 18  BP: (122-146)/() 137/91  SpO2:  [94 %-99 %] 99 %  on  Flow (L/min):  [2] 2;   Device (Oxygen Therapy): room air  Body mass index is 39.04 kg/m².    Wt Readings from Last 3 Encounters:   20 90.7 kg (199 lb 14.4 oz)   No intake or output data in the 24 hours ending 20 1246  NPO Diet  ----------------------------------------------------------------------------------------------------------------------  Physical exam:  General: Comfortable, Not in distress.  Well-developed and well-nourished.   HENT:  Head:   Normocephalic and atraumatic.  Mouth:  Moist mucous membranes.    Eyes:  Conjunctivae and EOM are normal.  Pupils are equal, round, and reactive to light.  No scleral icterus.    Neck:  Neck supple.  No JVD present.  trachea midline.   Cardiovascular:  Normal rate, regular rhythm with no murmur.  Pulmonary/Chest:  No respiratory distress, no wheezes, no crackles, with normal breath sounds and good air movement. No chest wall tenderness.   Abdomen:  Soft.  Bowel sounds are normal.  No distension and no tenderness.   Musculoskeletal:  No edema, no tenderness, and no deformity.  No red or swollen joints anywhere.    Neurological:  Alert, answering in yes and no. No focal deficits. No facial droop.   Skin:  Skin is warm and dry. No rash noted. No pallor.   Peripheral vascular: pulses in all 4 extremities with no clubbing, no cyanosis, no edema.  Genitourinary: no rosen  ----------------------------------------------------------------------------------------------------------------------  ----------------------------------------------------------------------------------------------------------------------Results from last 7 days   Lab Units 08/17/20  0523 08/17/20  0421 08/17/20  0255 08/16/20  2225   CRP mg/dL 3.76*  --   --  4.38*   LACTATE mmol/L  --   --  1.2 2.6*   WBC 10*3/mm3  --  9.93  --  12.67*   HEMOGLOBIN g/dL  --  14.0  --  14.4   HEMATOCRIT %  --  43.5  --  43.4   MCV fL  --  99.8*  --  96.7   MCHC g/dL  --  32.2  --  33.2   PLATELETS 10*3/mm3  --  194  --  208   INR   --   --   --  1.00     Results from last 7 days   Lab Units 08/17/20  0523 08/17/20  0421 08/16/20  2225   SODIUM mmol/L  --  141 137   POTASSIUM mmol/L  --  3.9 4.6   MAGNESIUM mg/dL 1.9  --   --    CHLORIDE mmol/L  --  106 101   CO2 mmol/L  --  24.5 23.2   BUN mg/dL  --  9 11   CREATININE mg/dL  --  0.55* 0.67   PHOSPHORUS mg/dL 3.3  --   --    EGFR IF NONAFRICN AM mL/min/1.73  --  115 91   CALCIUM mg/dL  --  8.8 9.2   GLUCOSE mg/dL  --   166* 225*   ALBUMIN g/dL  --  4.00 4.03   BILIRUBIN mg/dL  --  0.3 0.2   ALK PHOS U/L  --  158* 165*   AST (SGOT) U/L  --  20 39*   ALT (SGPT) U/L  --  30 32   Estimated Creatinine Clearance: 116 mL/min (A) (by C-G formula based on SCr of 0.55 mg/dL (L)).  Results from last 7 days   Lab Units 08/17/20  0809 08/17/20  0351 08/17/20  0111   TROPONIN T ng/mL <0.010 <0.010 <0.010     Hemoglobin A1C   Date/Time Value Ref Range Status   08/17/2020 0421 6.80 (H) 4.80 - 5.60 % Final     No results found for: AMMONIA  Results from last 7 days   Lab Units 08/17/20  0421   CHOLESTEROL mg/dL 164   TRIGLYCERIDES mg/dL 110   HDL CHOL mg/dL 59   LDL CHOL mg/dL 83       No results found for: BLOODCXNo results found for: RESPCXNo results found for: URINECXNo results found for: WOUNDCXNo results found for: BODYFLDCXNo results found for: STOOLCX  I have personally looked at the labs and they are summarized above.  ----------------------------------------------------------------------------------------------------------------------  Imaging Results (Last 24 Hours)     Procedure Component Value Units Date/Time    CT Abdomen With Contrast [[id number]] Collected:  08/16/20 2343     Updated:  08/16/20 2345    Narrative:       CT Abdomen W    INDICATION:   Abdominal pain tonight with suspected colitis or gastroenteritis.    TECHNIQUE:   CT of the abdomen with IV contrast. Coronal and sagittal reconstructions were obtained.  Radiation dose reduction techniques included automated exposure control or exposure modulation based on body size. Count of known CT and cardiac nuc med studies  performed in previous 12 months: 0.     COMPARISON:   None available.    FINDINGS:  Lung bases are clear. The liver, spleen, pancreas, adrenal glands and kidneys are normal. The gallbladder contains a calcified stone measuring 2.2 cm in diameter. The bowel is normal. There is no evidence of bowel obstruction or colitis. Aorta is normal  in size. There is no  adenopathy. Bones are unremarkable.      Impression:       2.2 cm gallstone    No evidence of colitis or gastroenteritis. No evidence of obstruction          Signer Name: Delta Quinonez MD   Signed: 8/16/2020 11:43 PM   Workstation Name: [facility]    CT Cervical Spine Without Contrast [[id number]] Collected:  08/16/20 2218     Updated:  08/16/20 2220    Narrative:       CT Spine Cervical WO    INDICATION:   Neck pain after fall today with posterior laceration TECHNIQUE:   CT of the cervical spine without IV contrast. Coronal and sagittal reconstructions were obtained.  Radiation dose reduction techniques included automated exposure control or exposure modulation based on body size. Count of known CT and cardiac nuc med  studies performed in previous 12 months: 0.     COMPARISON:  None available.    FINDINGS:  The alignment is normal. There is disc space narrowing at C2-3, C3-4, and C4-5.. No fractures visible. Patient's head is rotated to the left significantly. The C1 ring is intact      Impression:       Degenerative changes. No acute abnormality    Signer Name: Delta Quinonez MD   Signed: 8/16/2020 10:18 PM   Workstation Name: [facility]    XR Chest 1 View [[id number]] Collected:  08/16/20 2136     Updated:  08/16/20 2139    Narrative:       CR Chest 1 Vw    INDICATION:   55-year-old female in the emergency department with chest pain.     COMPARISON:    None available.    FINDINGS:  Single portable AP view(s) of the chest.  There is rotation to the right. Cardiac silhouette within normal limits for technique. Shallow lung expansion with bronchovascular crowding. There is asymmetric atelectasis or faint infiltrate in the left midlung  and perihilar lung extending into the left base. No pneumothorax or dense consolidation. There is gaseous distention of the stomach.      Impression:         1. Low-volume image with bronchovascular crowding  favored over mild central vascular congestion.  2. Asymmetric opacities on the left are nonspecific. No dense consolidation or effusion.    Signer Name: Minh Chen MD   Signed: 8/16/2020 9:36 PM   Workstation Name: [facility]    [facility]    CT Head Without Contrast [[id number]] Collected:  08/16/20 2134     Updated:  08/16/20 2136    Narrative:       CT Head WO    HISTORY:   55-year-old female with a headache. Fell with altered mental status. Laceration to the posterior aspect of the head. In the emergency Department.    TECHNIQUE:   Axial unenhanced head CT. Radiation dose reduction techniques included automated exposure control or exposure modulation based on body size. Count of known CT and cardiac nuc med studies performed in previous 12 months: 0.     Time of scan: 2111 hours    COMPARISON:   None.    FINDINGS:     Motion degraded study. These were the best images possible.    There is extensive encephalomalacic change in the left MCA territory extending into the left parieto-occipital lobe and extending into the vertex. Background atrophy and probable mild periventricular chronic ischemic change. No evidence of acute  intracranial hemorrhage. There is no mass, mass effect or edema to suggest acute infarct and no extra-axial fluid collection is present. No midline shift. There is a scalp hematoma posteriorly on the right measuring 5.5 x 0.6 cm. No underlying calvarial  fracture. The globes are intact and the bones are intact. Sinuses are clear.      Impression:         1. No clearly acute intracranial process. No evidence of acute intracranial hemorrhage.  2. Scalp hematoma posteriorly on the right.  3. Chronic appearing encephalomalacic change from remote infarct in the left MCA territory.  4. Atrophy and probable chronic ischemic changes..          Signer Name: Minh Chen MD   Signed: 8/16/2020 9:34 PM   Workstation Name: [facility]    [facility]         I have personally reviewed the radiology images and read the final radiology report.    Assessment & Plan      Assessment:  Chest pain post fall  Sepsis secondary to Acute UTI  New DM2 with A1C 6.8  COPD  Cholelithiasis 2.2 cm  H/O CVA  H/O Seizure  Intellectual Disability  H/O recent fall  Obesity      Plan:  Chest pain post fall, troponin negative.  Nuclear stress test done this morning.  Report pending. On ASA.  Lipid panel done normal.     Sepsis secondary to Acute UTI, UA abnormal.  Will start on Rocephin and lactobacillus.  Follow-up urine culture.  Currently patient is afebrile VSS.  Leukocytosis resolved and CRP improving.  Prelim blood cultures are no growth.  On IV fluids.    New DM2 with A1C 6.8, will start on carb consistent diet.     COPD, stable.  Monitor    Cholelithiasis 2.2 cm, outpatient follow-up.    H/O Seizure, stable.  On phenytoin.  Phenytoin level normal.  Discharge to nursing home in 24-48 hrs.     Management plan discussed in detail with nursing and patient.      The patient is high risk due to the following diagnoses/reasons: Chest pain post fall, Sepsis secondary to Acute UTI  New DM2 with A1C 6.8    Kyara Browning MD  08/17/20  12:46

## 2020-08-17 NOTE — ED NOTES
Attempted to call POA at this time. Did not answer. Voicemail not setup     Anup, Zev, RN  08/17/20 0288

## 2020-08-17 NOTE — THERAPY EVALUATION
Acute Care - Physical Therapy Initial Evaluation   Laci     Patient Name: Marguerite Brush  : 1965  MRN: [id number]  Today's Date: 2020   Onset of Illness/Injury or Date of Surgery: 20  Date of Referral to PT: 20  Referring Physician: Dr. Valdez      Admit Date: 2020    Visit Dx:     ICD-10-CM ICD-9-CM   1. Chest pain, unspecified type R07.9 786.50   2. Fall from ground level W18.30XA E888.9     Patient Active Problem List   Diagnosis   • Chest pain   • History of CVA (cerebrovascular accident)   • History of seizures   • Intellectual disability     Past Medical History:   Diagnosis Date   • COPD (chronic obstructive pulmonary disease) (CMS/[facility])    • History of CVA (cerebrovascular accident) 2020   • History of seizures 2020   • Intellectual disability 2020     No past surgical history on file.     PT ASSESSMENT (last 12 hours)      Physical Therapy Evaluation     Row Name 20 1500          PT Evaluation Time/Intention    Subjective Information  no complaints  -AG     Document Type  evaluation  -AG     Mode of Treatment  individual therapy;physical therapy  -AG     Patient Effort  adequate  -AG     Symptoms Noted During/After Treatment  fatigue  -AG     Row Name 20 1500          General Information    Patient Profile Reviewed?  yes  -AG     Onset of Illness/Injury or Date of Surgery  20  -AG     Referring Physician  Dr. Valdez  -AG     Patient Observations  alert;cooperative;agree to therapy  -AG     Patient/Family Observations  patient supine in bed with nc O2.  Patient alert and cooperative.   -AG     Prior Level of Function  -- unknown; patient unable to provide accurate PLOF  -AG     Equipment Currently Used at Home  -- unknown  -AG     Pertinent History of Current Functional Problem  patient admitted from SNF with chest pain.   -AG     Existing Precautions/Restrictions  fall;seizures  -AG     Limitations/Impairments  safety/cognitive  -AG      Equipment Issued to Patient  gait belt  -AG     Risks Reviewed  patient:;LOB;dizziness  -AG     Benefits Reviewed  patient:;improve function;increase independence;increase strength;increase balance;increase knowledge  -AG     Barriers to Rehab  previous functional deficit  -AG     Row Name 08/17/20 1500          Relationship/Environment    Lives With  facility resident  -     Row Name 08/17/20 1500          Cognitive Assessment/Intervention- PT/OT    Follows Commands (Cognition)  follows one step commands;physical/tactile prompts required;repetition of directions required;verbal cues/prompting required  -AG     Safety Deficit (Cognitive)  moderate deficit;severe deficit  -AG     Row Name 08/17/20 1500          Mobility Assessment/Treatment    Extremity Weight-bearing Status  left lower extremity;right lower extremity  -AG     Left Lower Extremity (Weight-bearing Status)  full weight-bearing (FWB)  -AG     Right Lower Extremity (Weight-bearing Status)  full weight-bearing (FWB)  -AG     Row Name 08/17/20 1500          Bed Mobility Assessment/Treatment    Bed Mobility Assessment/Treatment  scooting/bridging;supine-sit;sit-supine  -AG     Scooting/Bridging [place] (Bed Mobility)  verbal cues;nonverbal cues (demo/gesture);moderate assist (50% patient effort);maximum assist (25% patient effort)  -AG     Supine-Sit [place] (Bed Mobility)  verbal cues;nonverbal cues (demo/gesture);moderate assist (50% patient effort)  -AG     Sit-Supine [place] (Bed Mobility)  verbal cues;nonverbal cues (demo/gesture);minimum assist (75% patient effort);moderate assist (50% patient effort)  -AG     Bed Mobility, Safety Issues  cognitive deficits limit understanding;decreased use of arms for pushing/pulling;decreased use of legs for bridging/pushing;impaired trunk control for bed mobility  -AG     Assistive Device (Bed Mobility)  bed rails;draw sheet  -     Row Name 08/17/20 1500          Transfer Assessment/Treatment     Transfer Assessment/Treatment  sit-stand transfer;stand-sit transfer  -     Sit-Stand [place] (Transfers)  verbal cues;nonverbal cues (demo/gesture);moderate assist (50% patient effort)  -AG     Stand-Sit [place] (Transfers)  verbal cues;nonverbal cues (demo/gesture);moderate assist (50% patient effort)  -AG     Row Name 08/17/20 1500          Sit-Stand Transfer    Assistive Device (Sit-Stand Transfers)  walker, front-wheeled  -AG     Row Name 08/17/20 1500          Stand-Sit Transfer    Assistive Device (Stand-Sit Transfers)  walker, front-wheeled  -AG     Row Name 08/17/20 1500          Gait/Stairs Assessment/Training    Gait/Stairs Assessment/Training  gait/ambulation independence;gait/ambulation assistive device;distance ambulated;gait pattern;gait deviations;maintains weight-bearing status  -     [place] Level (Gait)  verbal cues;nonverbal cues (demo/gesture);minimum assist (75% patient effort);moderate assist (50% patient effort);2 person assist  -     Assistive Device (Gait)  walker, front-wheeled  -     Distance in Feet (Gait)  5 sidesteps to L toward HOB  -     Pattern (Gait)  -- no forward ambulation; standing march attempted-unable  -AG     Comment (Gait/Stairs)  patient sits suddenly and without warning.   -     Row Name 08/17/20 1500          General ROM    GENERAL ROM COMMENTS  all extremities AROM WFL  -[facility] Name 08/17/20 1500          Motor Assessment/Intervention    Additional Documentation  Balance (Group);Balance Interventions (Group)  -     Row Name 08/17/20 1500          Balance    Balance  static sitting balance;static standing balance;dynamic sitting balance;dynamic standing balance  -[facility] Name 08/17/20 1500          Static Sitting Balance    Level of [place] (Unsupported Sitting, Static Balance)  minimal assist, 75% patient effort posterior lean  -AG     Sitting Position (Unsupported Sitting, Static Balance)  sitting on edge of bed  -AG     Time  Able to Maintain Position (Unsupported Sitting, Static Balance)  more than 5 minutes  -AG     [facility] Name 08/17/20 1500          Static Standing Balance    Level of [place] (Supported Standing, Static Balance)  minimal assist, 75% patient effort  -AG     Assistive Device Utilized (Supported Standing, Static Balance)  walker, rolling  -AG     [facility] Name 08/17/20 1500          Dynamic Standing Balance    Level of [place], Reaches Outside Midline (Standing, Dynamic Balance)  moderate assist, 50 to 74% patient effort  -[facility] Name 08/17/20 1500          Vision Assessment/Intervention    Visual Impairment/Limitations  unable/difficult to assess  -[facility] Name 08/17/20 1500          Pain Assessment    Additional Documentation  Pain Scale: FACES Pre/Post-Treatment (Group)  -AG     Row Name 08/17/20 1500          Pain Scale: FACES Pre/Post-Treatment    Pain: FACES Scale, Pretreatment  0-->no hurt  -AG     Pain: FACES Scale, Post-Treatment  0-->no hurt  -AG     Row Name             Wound 08/17/20 0451 medial;proximal coccyx Fissure    Wound - Properties Group Date first assessed: 08/17/20  -AM Time first assessed: 0451  -AM Present on Hospital Admission: Y  -AM Orientation: medial;proximal  -AM Location: coccyx  -AM Primary Wound Type: Fissure  -AM    [facility] Name 08/17/20 1500          Coping    Observed Emotional State  calm;cooperative  -AG     Verbalized Emotional State  anxiety  -AG     [facility] Name 08/17/20 1500          Plan of Care Review    Plan of Care Reviewed With  patient  -AG     Row Name 08/17/20 1500          Physical Therapy Clinical Impression    Date of Referral to PT  08/17/20  -AG     PT Diagnosis (PT Clinical Impression)  difficulty ambulating  -AG     Prognosis (PT Clinical Impression)  fair  -AG     Functional Level at Time of Evaluation (PT Clinical Impression)  Mod A  -AG     Patient/Family Goals Statement (PT Clinical Impression)  pt. unable to formulate goals  -AG     Criteria for Skilled  Interventions Met (PT Clinical Impression)  yes;treatment indicated  -AG     Pathology/Pathophysiology Noted (Describe Specifically for Each System)  musculoskeletal;neuromuscular  -AG     Impairments Found (describe specific impairments)  aerobic capacity/endurance;gait, locomotion, and balance;joint integrity and mobility  -AG     Functional Limitations in Following Categories (Describe Specific Limitations)  community/leisure  -AG     Rehab Potential (PT Clinical Summary)  fair, will monitor progress closely  -AG     Predicted Duration of Therapy (PT)  LOS  -AG     Care Plan Review (PT)  evaluation/treatment results reviewed;care plan/treatment goals reviewed;risks/benefits reviewed;current/potential barriers reviewed;patient/other agree to care plan  -AG     Row Name 08/17/20 1500          Physical Therapy Goals    Bed Mobility Goal Selection (PT)  bed mobility, PT goal 1  -AG     Transfer Goal Selection (PT)  transfer, PT goal 1  -AG     Gait Training Goal Selection (PT)  gait training, PT goal 1  -AG     Row Name 08/17/20 1500          Bed Mobility Goal 1 (PT)    Activity/Assistive Device (Bed Mobility Goal 1, PT)  scooting;sit to supine/supine to sit  -AG     [place] Level/Cues Needed (Bed Mobility Goal 1, PT)  contact guard assist  -AG     Time Frame (Bed Mobility Goal 1, PT)  by discharge  -AG     Row Name 08/17/20 1500          Transfer Goal 1 (PT)    Activity/Assistive Device (Transfer Goal 1, PT)  sit-to-stand/stand-to-sit;bed-to-chair/chair-to-bed;walker, rolling  -AG     [place] Level/Cues Needed (Transfer Goal 1, PT)  contact guard assist  -AG     Time Frame (Transfer Goal 1, PT)  by discharge  -AG     Row Name 08/17/20 1500          Gait Training Goal 1 (PT)    Activity/Assistive Device (Gait Training Goal 1, PT)  gait (walking locomotion);assistive device use;decrease fall risk;diminish gait deviation;improve balance and speed;increase endurance/gait distance;walker, rolling  -AG      [place] Level (Gait Training Goal 1, PT)  minimum assist (75% or more patient effort)  -AG     Distance (Gait Goal 1, PT)  10  -AG     Time Frame (Gait Training Goal 1, PT)  by discharge  -AG     Row Name 08/17/20 1500          Positioning and Restraints    Pre-Treatment Position  in bed  -AG     Post Treatment Position  bed  -AG     In Bed  supine;call light within reach;encouraged to call for assist;side rails up x3  -AG       User Key  (r) = Recorded By, (t) = Taken By, (c) = Cosigned By    Initials Name Provider Type    Nancy Villalta, PT Physical Therapist    Harshad Escamilla, RN Registered Nurse        Physical Therapy Education                 Title: PT OT SLP Therapies (In Progress)     Topic: Physical Therapy (Not Started)     Point: Mobility training (Not Started)     Description:   Instruct learner(s) on safety and technique for assisting patient out of bed, chair or wheelchair.  Instruct in the proper use of assistive devices, such as walker, crutches, cane or brace.              Patient Friendly Description:   It's important to get you on your feet again, but we need to do so in a way that is safe for you. Falling has serious consequences, and your personal safety is the most important thing of all.        When it's time to get out of bed, one of us or a family member will sit next to you on the bed to give you support.     If your doctor or nurse tells you to use a walker, crutches, a cane, or a brace, be sure you use it every time you get out of bed, even if you think you don't need it.    Learner Progress:   Not documented in this visit.          Point: Home exercise program (Not Started)     Description:   Instruct learner(s) on appropriate technique for monitoring, assisting and/or progressing patient with therapeutic exercises and activities.              Learner Progress:   Not documented in this visit.          Point: Body mechanics (Not Started)     Description:   Instruct learner(s)  on proper positioning and spine alignment for patient and/or caregiver during mobility tasks and/or exercises.              Learner Progress:   Not documented in this visit.          Point: Precautions (Not Started)     Description:   Instruct learner(s) on prescribed precautions during mobility and gait tasks              Learner Progress:   Not documented in this visit.                          PT Recommendation and Plan  Anticipated Discharge Disposition (PT): extended care facility  Planned Therapy Interventions (PT Eval): balance training, bed mobility training, gait training, home exercise program, patient/family education, strengthening, transfer training  Therapy Frequency (PT Clinical Impression): other (see comments)(3-5 times/ week per priority)  Outcome Summary/Treatment Plan (PT)  Anticipated Equipment Needs at Discharge (PT): (TBD)  Anticipated Discharge Disposition (PT): extended care facility  Plan of Care Reviewed With: patient     Time Calculation:   PT Charges     Row Name 08/17/20 1531             Time Calculation    PT Received On  08/17/20  -      PT Goal Re-Cert Due Date  08/31/20  -        User Key  (r) = Recorded By, (t) = Taken By, (c) = Cosigned By    Initials Name Provider Type    Nancy Villalta, PT Physical Therapist        Therapy Charges for Today     Code Description Service Date Service Provider Modifiers Qty    99579712297 HC PT AQUA EVAL MOD COMPLEXITY 4 8/17/2020 Nancy Moore, PT GP 1                 Nancy Moore PT  8/17/2020

## 2020-08-17 NOTE — PROGRESS NOTES
Discharge Planning Assessment   Laci     Patient Name: Marguerite Brush  MRN: [id number]  Today's Date: 8/17/2020    Admit Date: 8/16/2020        Discharge Plan     Row Name 08/17/20 1214       Plan    Plan  Pt admitted on 8/16/20 from [facility] in [place].  Carlton Sifuentes at [facility] pt has a skilled bed reserved until further notice.  Per Maria pt will need Covid test prior to discharge.  SS will follow.         SANDOVAL BarbozaW

## 2020-08-17 NOTE — THERAPY EVALUATION
Acute Care - Occupational Therapy Initial Evaluation   Laci     Patient Name: Marguerite Brush  : 1965  MRN: [id number]  Today's Date: 2020  Onset of Illness/Injury or Date of Surgery: 20          Admit Date: 2020       ICD-10-CM ICD-9-CM   1. Chest pain, unspecified type R07.9 786.50   2. Fall from ground level W18.30XA E888.9     Patient Active Problem List   Diagnosis   • Chest pain   • History of CVA (cerebrovascular accident)   • History of seizures   • Intellectual disability     Past Medical History:   Diagnosis Date   • COPD (chronic obstructive pulmonary disease) (CMS/[facility])    • History of CVA (cerebrovascular accident) 2020   • History of seizures 2020   • Intellectual disability 2020     No past surgical history on file.       OT ASSESSMENT FLOWSHEET (last 12 hours)      Occupational Therapy Evaluation     Row Name 20 1423                   OT Evaluation Time/Intention    Subjective Information  no complaints  -LM        Document Type  evaluation  -LM        Mode of Treatment  occupational therapy  -LM        Patient Effort  adequate  -LM           General Information    Patient Profile Reviewed?  yes  -LM        Onset of Illness/Injury or Date of Surgery  20  -LM        Patient Observations  alert;cooperative;agree to therapy  -LM        Prior Level of Function  -- unknown, patient unable to provide PLOF  -LM        Equipment Currently Used at Home  -- unknown  -LM        Pertinent History of Current Functional Problem  Admitted from snf with chest pain following fall.  PMH:  seizures, cva, intecllectal disability, copd  -LM        Existing Precautions/Restrictions  fall;seizures decreased cognition  -LM        Limitations/Impairments  safety/cognitive  -LM        Benefits Reviewed  patient:;improve function;increase independence;increase balance;increase strength  -LM           Relationship/Environment    Lives With  facility resident  -LM            Resource/Environmental Concerns    Current Living Arrangements  extended care facility  -           Cognitive Assessment/Intervention- PT/OT    Orientation Status (Cognition)  unable/difficult to assess  -LM        Follows Commands (Cognition)  follows one step commands;0-24% accuracy;25-49% accuracy;delayed response/completion;increased processing time needed;initiation impaired;physical/tactile prompts required;repetition of directions required;verbal cues/prompting required  -LM        Safety Deficit (Cognitive)  severe deficit;ability to follow commands;awareness of need for assistance;impulsivity;insight into deficits/self awareness;judgment;safety precautions awareness;problem solving;safety precautions follow-through/compliance  -LM           ADL Assessment/Intervention    BADL Assessment/Intervention  bathing;upper body dressing;lower body dressing;grooming;feeding;toileting  -LM           Bathing Assessment/Intervention    Bathing [place] Level  maximum assist (25% patient effort)  -LM           Upper Body Dressing Assessment/Training    Upper Body Dressing [place] Level  maximum assist (25% patient effort)  -LM           Lower Body Dressing Assessment/Training    Lower Body Dressing [place] Level  dependent (less than 25% patient effort);maximum assist (25% patient effort)  -LM           Grooming Assessment/Training    [place] Level (Grooming)  moderate assist (50% patient effort)  -LM           Self-Feeding Assessment/Training    [place] Level (Feeding)  set up  -LM           Toileting Assessment/Training    [place] Level (Toileting)  moderate assist (50% patient effort);maximum assist (25% patient effort)  -LM           General ROM    GENERAL ROM COMMENTS  bue arom wfl  -LM           MMT (Manual Muscle Testing)    General MMT Comments  bue 3/5  -LM           Positioning and Restraints    Post Treatment Position  bed  -LM        In Bed  call light within reach;encouraged to  call for assist;exit alarm on  -LM           Wound 08/17/20 0451 medial;proximal coccyx Fissure    Wound - Properties Group Date first assessed: 08/17/20  -AM Time first assessed: 0451  -AM Present on Hospital Admission: Y  -AM Orientation: medial;proximal  -AM Location: coccyx  -AM Primary Wound Type: Fissure  -AM       Clinical Impression (OT)    OT Diagnosis  debility  -LM        Criteria for Skilled Therapeutic Interventions Met (OT Eval)  yes  -LM        Rehab Potential (OT Eval)  fair, will monitor progress closely  -LM        Care Plan Review (OT)  evaluation/treatment results reviewed;care plan/treatment goals reviewed;patient/other agree to care plan  -LM           Planned OT Interventions    Planned Therapy Interventions (OT Eval)  activity tolerance training;BADL retraining;patient/caregiver education/training;ROM/therapeutic exercise;strengthening exercise;transfer/mobility retraining;adaptive equipment training  -LM           OT Goals    Transfer Goal Selection (OT)  transfer, OT goal 1  -LM        Activity Tolerance Goal Selection (OT)  activity tolerance, OT goal 1  -LM        Additional Documentation  Activity Tolerance Goal Selection (OT) (Row)  -LM           Transfer Goal 1 (OT)    Activity/Assistive Device (Transfer Goal 1, OT)  toilet;commode, 3-in-1;walker, rolling  -LM        [place] Level/Cues Needed (Transfer Goal 1, OT)  minimum assist (75% or more patient effort)  -LM        Time Frame (Transfer Goal 1, OT)  by discharge  -LM            Activity Tolerance Goal 1 (OT)    Activity Tolerance Goal 1 (OT)  increase fxl activity tolerance to assist with adl and fxl mobility  -LM        Activity Level (Endurance Goal 1, OT)  15 min activity  -LM        Time Frame (Activity Tolerance Goal 1, OT)  by discharge  -LM          User Key  (r) = Recorded By, (t) = Taken By, (c) = Cosigned By    Initials Name Effective Dates    LM Yadira Salcedo OT 03/14/16 -     AM Harshad Dickinson RN 06/17/20 -           Occupational Therapy Education                 Title: PT OT SLP Therapies (Done)     Topic: Occupational Therapy (Done)     Point: ADL training (Done)     Description:   Instruct learner(s) on proper safety adaptation and remediation techniques during self care or transfers.   Instruct in proper use of assistive devices.              Learning Progress Summary           Patient Acceptance, E,D, VU,DU,NR by  at 8/17/2020 1432                   Point: Home exercise program (Done)     Description:   Instruct learner(s) on appropriate technique for monitoring, assisting and/or progressing therapeutic exercises/activities.              Learning Progress Summary           Patient Acceptance, E,D, VU,DU,NR by  at 8/17/2020 1432                   Point: Precautions (Done)     Description:   Instruct learner(s) on prescribed precautions during self-care and functional transfers.              Learning Progress Summary           Patient Acceptance, E,D, VU,DU,NR by  at 8/17/2020 1432                   Point: Body mechanics (Done)     Description:   Instruct learner(s) on proper positioning and spine alignment during self-care, functional mobility activities and/or exercises.              Learning Progress Summary           Patient Acceptance, E,D, VU,DU,NR by  at 8/17/2020 1432                               User Key     Initials Effective Dates Name Provider Type Discipline     03/14/16 -  Yadira Salcedo OT Occupational Therapist OT                  OT Recommendation and Plan  Planned Therapy Interventions (OT Eval): activity tolerance training, BADL retraining, patient/caregiver education/training, ROM/therapeutic exercise, strengthening exercise, transfer/mobility retraining, adaptive equipment training           Time Calculation:     Therapy Charges for Today     Code Description Service Date Service Provider Modifiers Qty    [id number]  OT EVAL MOD COMPLEXITY 4 8/17/2020 Yadira Salcedo OT GO 1                Yadira Salcedo, OT  8/17/2020

## 2020-08-17 NOTE — PLAN OF CARE
Consult received. Chart reviewed. SATURNINO Talavera contacts SLP via telephone who reports pt is currently NPO for chemical stress test/cardiology. Per this status clinical dysphagia assessment is deferred at this time. SLP to f/u tomorrow am.    Thank you-  Paz Callahan M.S., CCC-SLP

## 2020-08-17 NOTE — ED NOTES
No stick pad and wrap applied to patient head at this time per Dr. Pierre order     Zev Hebert, RN  08/17/20 0303       Zev Hebert RN  08/17/20 0301

## 2020-08-17 NOTE — ED NOTES
Patient gone CT at this time. Will draw labs when patient returns.      Scott Agarwal  08/16/20 7114

## 2020-08-17 NOTE — PLAN OF CARE
Stress test and Echocardiogram completed today. Patient denies further chest pain. Magnesium low, to be replaced during shift. Straight cath ordered to obtain urine sample. Patient very pleasant with no complaint or acute distress.

## 2020-08-17 NOTE — H&P
[facility] Medicine Services  HISTORY & PHYSICAL    Patient Identification:  Name:  Marguerite Brush  Age:  55 y.o.  Sex:  female  :  1965  MRN:  [id number]   Visit Number:  [id number]  Primary Care Physician:  Alonso Mendez MD     Subjective     Chief complaint:   Chief Complaint   Patient presents with   • Fall   • Chest Pain     History of presenting illness:   Patient is a 55 y.o. female with past medical history significant for seizures, history of CVA, intellectual disability, COPD, that presented to the [facility] emergency department for evaluation of chest pain and recent fall.    It should be of note that the patient has an intellectual disability and a history of a CVA and is therefore unable to give an extensive history of presenting illness.  She is able to answer simple yes and no questions.  She is able to verbally communicate but her speech is difficult to understand.  Upon evaluation at bedside, when questioned she was hurting anywhere she pointed to her chest and right shoulder.  She denies any headache or abdominal pain.  She did point and show me the laceration on the occipital portion of her head.    SATURNINO Patricia was able to speak with with the patient's nurse at [facility] in [place], [place] who stated that the patient got up by herself yesterday evening, which she typically does not do, to unplug her TV and she fell backwards hitting her head on a piece of furniture.  The nursing home believes that the patient experienced some chest pain as she pointed to her chest and said it hurt after she fell.  They are unsure if she lost consciousness but they do not believe that she did. The nursing home stated that she is on a reduced carb, mechanical soft and thin liquid diet.  Currently awaiting records from [facility].     SATURNINO Patricia present at bedside during exam.    Upon arrival to the ED, vitals were temperature 98.3 °F,  pulse 92, respirations 18, /94, SPO2 98% on room air.  Troponin T negative x2.  CMP with glucose 225, alkaline phosphatase 165, AST 39.  C-reactive protein 4.38.  Lactate 2.6, repeat 1.2.  CBC WBC 12.67, otherwise unremarkable.  Blood cultures pending x2.  Chest x-ray shows low volume image shows bronchovascular crowding favored over mild central vascular congestion, asymmetric opacities on the left are nonspecific, no dense consolidation or effusion.  CT of abdomen with contrast shows 2.2 cm gallstone, no evidence of colitis or gastroenteritis or obstruction.  CT of cervical spine shows degenerative changes no acute abnormality.  CT of head without contrast shows no acute intracranial process, no evidence of intracranial hemorrhage, scalp hematoma posteriorly on the right, chronic appearing encephalomalacic change from remote infarct in the left MCA territory, atrophy and probable chronic ischemic changes.  EKG shows normal sinus rhythm, heart rate 97 bpm, QTc 434 MS.    In the emergency department the patient received p.o. aspirin 324 mg, IV Zofran 4 mg, IV normal saline 1000 mill bolus.    Patient has been admitted to the telemetry floor as an observation patient for further evaluation and treatment.  ---------------------------------------------------------------------------------------------------------------------   Review of Systems   Unable to perform ROS: Other (Limited review of systems due to patient's intellectual disability)   Cardiovascular: Positive for chest pain.   Gastrointestinal: Negative for abdominal pain and nausea.   Skin: Positive for wound (Laceration occipital lobe).   Neurological: Negative for headaches.      ---------------------------------------------------------------------------------------------------------------------   Past Medical History:   Diagnosis Date   • COPD (chronic obstructive pulmonary disease) (CMS/[facility])    • History of CVA (cerebrovascular accident) 8/17/2020      • History of seizures 8/17/2020   • Intellectual disability 8/17/2020     No past surgical history on file.  History reviewed. No pertinent family history.  Social History     Socioeconomic History   • Marital status: Single     Spouse name: Not on file   • Number of children: Not on file   • Years of education: Not on file   • Highest education level: Not on file   Tobacco Use   • Smoking status: Never Smoker   • Smokeless tobacco: Never Used     ---------------------------------------------------------------------------------------------------------------------   Allergies:  Patient has no known allergies.  ---------------------------------------------------------------------------------------------------------------------   Medications below are reported home medications pulling from within the system; at this time, these medications have not been reconciled unless otherwise specified and are in the verification process for further verifcation as current home medications.    Prior to Admission Medications     None        ---------------------------------------------------------------------------------------------------------------------    Objective     Hospital Scheduled Meds:    sodium chloride 10 mL Intravenous Q12H          Current listed hospital scheduled medications may not yet reflect those currently placed in orders that are signed and held, awaiting patient's arrival to floor/unit.    ---------------------------------------------------------------------------------------------------------------------   Vital Signs:  Temp:  [98.3 °F (36.8 °C)-98.9 °F (37.2 °C)] 98.9 °F (37.2 °C)  Heart Rate:  [] 99  Resp:  [18] 18  BP: (122-146)/(80-94) 144/93  Mean Arterial Pressure (Non-Invasive) for the past 24 hrs (Last 3 readings):   Noninvasive MAP (mmHg)   08/17/20 0329 97   08/17/20 0049 96   08/16/20 2254 105     SpO2 Percentage    08/17/20 0225 08/17/20 0234 08/17/20 0329   SpO2: 98% 98% 94%     SpO2:   [94 %-99 %] 94 %  on  Flow (L/min):  [2] 2;   Device (Oxygen Therapy): room air    Body mass index is 39.04 kg/m².  Wt Readings from Last 3 Encounters:   08/17/20 90.7 kg (199 lb 14.4 oz)     ---------------------------------------------------------------------------------------------------------------------   Physical Exam:  Physical Exam   Constitutional: She appears well-developed and well-nourished. She is cooperative.   HENT:   Head: Normocephalic and atraumatic.   Nose: Nose normal.   Eyes: Conjunctivae and lids are normal. Right eye exhibits no discharge. Left eye exhibits no discharge. Right conjunctiva is not injected. Left conjunctiva is not injected.   Neck: Normal range of motion and full passive range of motion without pain. No JVD present. No muscular tenderness present. Carotid bruit is not present.   Cardiovascular: Normal rate, regular rhythm, normal heart sounds, intact distal pulses and normal pulses. Exam reveals no gallop, no friction rub and no decreased pulses.   No murmur heard.  Pulses:       Popliteal pulses are 2+ on the right side, and 2+ on the left side.        Dorsalis pedis pulses are 2+ on the right side, and 2+ on the left side.   Pulmonary/Chest: Effort normal. No tachypnea and no bradypnea. No respiratory distress. She has no decreased breath sounds. She has no wheezes. She has rhonchi (clears with coughing ) in the right upper field and the left upper field.   Abdominal: Soft. Normal appearance and bowel sounds are normal. She exhibits no distension and no mass. There is no tenderness.   Musculoskeletal:        Right lower leg: She exhibits no edema.        Left lower leg: She exhibits no edema.     Vascular Status -  Her right foot exhibits normal foot vasculature  and no edema. Her left foot exhibits normal foot vasculature  and no edema.  Skin Integrity  -  Her right foot skin is intact.Her left foot skin is intact..  Neurological: She is alert. She is disoriented (Oriented to  herself and month of her birthday. Disoriented to location, year, month. ).   Skin: Abrasion noted. No erythema.        Psychiatric: She has a normal mood and affect. Her mood appears not anxious. She does not exhibit a depressed mood.     ---------------------------------------------------------------------------------------------------------------------  EKG:    Pending cardiology read.  Per my evaluation, normal sinus rhythm with heart rate 97 bpm, QTc 434 MS.    Telemetry:    NSR, HR 92 bpm, SpO2 94% on RA.     I have personally reviewed the EKG/Telemetry strip  ---------------------------------------------------------------------------------------------------------------------   Results from last 7 days   Lab Units 08/17/20  0351 08/17/20  0111 08/16/20  2250   TROPONIN T ng/mL <0.010 <0.010 <0.010     Results from last 7 days   Lab Units 08/16/20  2225   PROBNP pg/mL 18.4     Results from last 7 days   Lab Units 08/17/20  0421   CHOLESTEROL mg/dL 164   TRIGLYCERIDES mg/dL 110   HDL CHOL mg/dL 59   LDL CHOL mg/dL 83       Results from last 7 days   Lab Units 08/17/20  0421 08/17/20  0255 08/16/20  2225   CRP mg/dL  --   --  4.38*   LACTATE mmol/L  --  1.2 2.6*   WBC 10*3/mm3 9.93  --  12.67*   HEMOGLOBIN g/dL 14.0  --  14.4   HEMATOCRIT % 43.5  --  43.4   MCV fL 99.8*  --  96.7   MCHC g/dL 32.2  --  33.2   PLATELETS 10*3/mm3 194  --  208   INR   --   --  1.00     Results from last 7 days   Lab Units 08/17/20  0421 08/16/20  2225   SODIUM mmol/L 141 137   POTASSIUM mmol/L 3.9 4.6   CHLORIDE mmol/L 106 101   CO2 mmol/L 24.5 23.2   BUN mg/dL 9 11   CREATININE mg/dL 0.55* 0.67   EGFR IF NONAFRICN AM mL/min/1.73 115 91   CALCIUM mg/dL 8.8 9.2   GLUCOSE mg/dL 166* 225*   ALBUMIN g/dL 4.00 4.03   BILIRUBIN mg/dL 0.3 0.2   ALK PHOS U/L 158* 165*   AST (SGOT) U/L 20 39*   ALT (SGPT) U/L 30 32   Estimated Creatinine Clearance: 116 mL/min (A) (by C-G formula based on SCr of 0.55 mg/dL (L)).  No results found for:  AMMONIA    No results found for: HGBA1C, POCGLU  No results found for: HGBA1C  No results found for: TSH, FREET4    Pain Management Panel     There is no flowsheet data to display.        I have personally reviewed the above laboratory results.   ---------------------------------------------------------------------------------------------------------------------  Imaging Results (Last 7 Days)     Procedure Component Value Units Date/Time    CT Abdomen With Contrast [[id number]] Collected:  08/16/20 2343     Updated:  08/16/20 2345    Narrative:       CT Abdomen W    INDICATION:   Abdominal pain tonight with suspected colitis or gastroenteritis.    TECHNIQUE:   CT of the abdomen with IV contrast. Coronal and sagittal reconstructions were obtained.  Radiation dose reduction techniques included automated exposure control or exposure modulation based on body size. Count of known CT and cardiac nuc med studies  performed in previous 12 months: 0.     COMPARISON:   None available.    FINDINGS:  Lung bases are clear. The liver, spleen, pancreas, adrenal glands and kidneys are normal. The gallbladder contains a calcified stone measuring 2.2 cm in diameter. The bowel is normal. There is no evidence of bowel obstruction or colitis. Aorta is normal  in size. There is no adenopathy. Bones are unremarkable.      Impression:       2.2 cm gallstone    No evidence of colitis or gastroenteritis. No evidence of obstruction          Signer Name: Delta Quinonez MD   Signed: 8/16/2020 11:43 PM   Workstation Name: [facility]    [facility]    CT Cervical Spine Without Contrast [[id number]] Collected:  08/16/20 2218     Updated:  08/16/20 2220    Narrative:       CT Spine Cervical WO    INDICATION:   Neck pain after fall today with posterior laceration TECHNIQUE:   CT of the cervical spine without IV contrast. Coronal and sagittal reconstructions were obtained.  Radiation dose reduction techniques included automated  exposure control or exposure modulation based on body size. Count of known CT and cardiac nuc med  studies performed in previous 12 months: 0.     COMPARISON:  None available.    FINDINGS:  The alignment is normal. There is disc space narrowing at C2-3, C3-4, and C4-5.. No fractures visible. Patient's head is rotated to the left significantly. The C1 ring is intact      Impression:       Degenerative changes. No acute abnormality    Signer Name: Delta Quinonez MD   Signed: 8/16/2020 10:18 PM   [facility]    [facility]    XR Chest 1 View [[id number]] Collected:  08/16/20 2136     Updated:  08/16/20 2139    Narrative:       CR Chest 1 Vw    INDICATION:   55-year-old female in the emergency department with chest pain.     COMPARISON:    None available.    FINDINGS:  Single portable AP view(s) of the chest.  There is rotation to the right. Cardiac silhouette within normal limits for technique. Shallow lung expansion with bronchovascular crowding. There is asymmetric atelectasis or faint infiltrate in the left midlung  and perihilar lung extending into the left base. No pneumothorax or dense consolidation. There is gaseous distention of the stomach.      Impression:         1. Low-volume image with bronchovascular crowding favored over mild central vascular congestion.  2. Asymmetric opacities on the left are nonspecific. No dense consolidation or effusion.    Signer Name: Minh Chen MD   Signed: 8/16/2020 9:36 PM   Workstation [facility]: [facility]    Radiology [facility]    CT Head Without Contrast [[id number]] Collected:  08/16/20 2134     Updated:  08/16/20 2136    Narrative:       CT Head WO    HISTORY:   55-year-old female with a headache. Fell with altered mental status. Laceration to the posterior aspect of the head. In the emergency Department.    TECHNIQUE:   Axial unenhanced head CT. Radiation dose reduction techniques included automated exposure control or  exposure modulation based on body size. Count of known CT and cardiac nuc med studies performed in previous 12 months: 0.     Time of scan: 2111 hours    COMPARISON:   None.    FINDINGS:     Motion degraded study. These were the best images possible.    There is extensive encephalomalacic change in the left MCA territory extending into the left parieto-occipital lobe and extending into the vertex. Background atrophy and probable mild periventricular chronic ischemic change. No evidence of acute  intracranial hemorrhage. There is no mass, mass effect or edema to suggest acute infarct and no extra-axial fluid collection is present. No midline shift. There is a scalp hematoma posteriorly on the right measuring 5.5 x 0.6 cm. No underlying calvarial  fracture. The globes are intact and the bones are intact. Sinuses are clear.      Impression:         1. No clearly acute intracranial process. No evidence of acute intracranial hemorrhage.  2. Scalp hematoma posteriorly on the right.  3. Chronic appearing encephalomalacic change from remote infarct in the left MCA territory.  4. Atrophy and probable chronic ischemic changes..          Signer Name: Minh Chen MD   Signed: 8/16/2020 9:34 PM   Workstation Name: [email or web address]    [facility]        I have personally reviewed the above radiology results.   ---------------------------------------------------------------------------------------------------------------------    Assessment & Plan      Active Hospital Problems    Diagnosis POA   • **Chest pain [R07.9] Yes   • History of CVA (cerebrovascular accident) [Z86.73] Not Applicable   • History of seizures [Z87.898] Not Applicable   • Intellectual disability [F79] Yes     · Chest pain with unknown features: Patient has intellectual disability and is therefore unable to elaborate on the nature of her chest pain.  She answers simple yes and no questions and will point to her chest when asked if she is  hurting.  Troponin T has been negative x2.  Continue to trend troponin and obtain serial EKGs.  Fasting a.m. lipid panel ordered.  Obtain transthoracic echo.  UDS ordered.  Patient is to be n.p.o. in anticipation for a chemical stress test.  Received loading dose aspirin in the emergency department.  Continue with daily aspirin.     · History of CVA with debility and recent fall with subsequent scalp hematoma on occipital portion of head: CT of head shows no acute intracranial process or intracranial hemorrhage, scalp hematoma posteriorly on the right, chronic appearing encephalomalacia change from remote infarct in the left MCA territory, atrophy and probable chronic ischemic changes.  CT of cervical spine shows no acute abnormality, degenerative changes. Nursing home is unsure, but does not believe the patient had a syncopal episode. We will continue with a transthoracic echo as stated above and obtain orthostatic vitals. Received IV NS 1000 mL bolus in ED. Patient is to be up with assistance, fall precautions in place. PT/OT consulted. Wound care PRN for laceration.     · Severe sepsis criteria present upon admission due to unknown etiology (lactate 2.6 > repeat 1.2, CrP 4.38, pulse 102, WBC 12.67): CXR shows no consolidation or effusion. CT of abdomen/pelvis revealed 2.2 cm gallstone, no evidence of colitis or gastroenteritis. UA ordered. Repeat AM CBC and CrP. IV NS 75 mL/h. Blood cultures pending x 2. Continue to monitor closely.     · Hyperglycemia present upon admission with no known history of diabetes mellitus: Hgb A1c ordered.     · Elevated AST (39): repeat AM CMP. If still elevated or worsened, obtain acute hepatitis panel.     · History of seizures: seizure precautions in place. Continue home antiepileptic medications once reconciled per pharmacy.     · ? History of CHF: currently awaiting nursing home records. PO lasix is on patients medication list. No prior echo to review. Appears compensated at  this time. Monitor for signs of volume overload with daily weights, strict I's and O's.     · Anxiety: Abundio ordered. Supportive care. Plan to continue home medications once reconciled per pharmacy.     · Intellectual disability: SLP consulted. Aspiration precautions in place. NH states diet is mechanical soft, thin liquid. PT/OT consulted. Up with assistance, fall precautions.     · Obesity: BMI 39.04 kg/m2.     · F/E/N: IV NS 75 mL/h. Replace electrolytes as necessary. NPO.   ---------------------------------------------------  DVT Prophylaxis: SCDS  GI Prophylaxis: Protonix  Activity: Up with assistance, fall precautions    OBSERVATION status, however if further evaluation or treatment plans warrant, status may change.  Based upon current information, I predict patient's care encounter to be less than or equal to 2 midnights.    Code Status: FULL CODE     I have discussed the patient's assessment and plan with the patient, nursing staff, and attending physician       Sabi Helm PA-C  Hospitalist Service -- [facility]       08/17/20  05:09    Attending Physician: Sushma Valdez DO

## 2020-08-17 NOTE — ED NOTES
Pt transported to [address] [place] [address] at this time by Zev Rodriguez, RN  08/17/20 0339

## 2020-08-17 NOTE — ED PROVIDER NOTES
Subjective   History of Present Illness    55-year-old female no listed past medical history presents to the emergency department by EMS.  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  Patient is difficult to understand, appears to be intellectually disabled.  History giving is limited.  However we do know that approximately 2 hours ago patient was getting up to walk to her TV when she experienced sudden onset pain chest pain, she fell at that time and struck her head on a piece of furniture. This was an unwitnessed fall.  She has a hemostatic abrasion and hematoma on her posterior scalp.  She does not know whether or not she lost consciousness.  Had some vomiting while in the emergency department.      Review of Systems   Constitutional: Negative.    HENT: Negative.    Eyes: Negative.    Respiratory: Negative.    Cardiovascular: Positive for chest pain. Negative for palpitations.   Gastrointestinal: Negative for abdominal pain.   Endocrine: Negative.    Genitourinary: Negative.    Musculoskeletal: Negative.    Skin: Negative.    Allergic/Immunologic: Negative.    Neurological: Negative.    Hematological: Negative.    Psychiatric/Behavioral: Negative.        Past Medical History:   Diagnosis Date   • COPD (chronic obstructive pulmonary disease) (CMS/[facility])    • History of CVA (cerebrovascular accident) 8/17/2020   • History of seizures 8/17/2020   • Intellectual disability 8/17/2020       No Known Allergies    No past surgical history on file.    History reviewed. No pertinent family history.    Social History     Socioeconomic History   • Marital status: Single     Spouse name: Not on file   • Number of children: Not on file   • Years of education: Not on file   • Highest education level: Not on file   Tobacco Use   • Smoking status: Never Smoker   • Smokeless tobacco:  Never Used           Objective   Physical Exam   Constitutional: She is oriented to person, place, and time. She appears well-developed and well-nourished.   HENT:   Right Ear: External ear normal.   Left Ear: External ear normal.   Hemostatic laceration over the occiput   Eyes: Pupils are equal, round, and reactive to light. EOM are normal.   Neck: Normal range of motion. Neck supple.   Tender over the midline of the neck, no pain with side-to-side rotation, patient sitting up in bed   Cardiovascular: Normal rate and regular rhythm.   Pulmonary/Chest: Effort normal and breath sounds normal. No respiratory distress.   Abdominal: Soft. There is no tenderness. There is no rebound and no guarding.   Musculoskeletal: Normal range of motion.   Neurological: She is alert and oriented to person, place, and time.   It is difficult to assess the patient's mentation she may be intellectually disabled however it may also be that she is confused after traumatic injury to the head.  She is oriented x3 and answers most of my questions appropriately however her speech is slow and difficult to understand   Skin: Skin is warm and dry. Capillary refill takes less than 2 seconds.   Psychiatric: She has a normal mood and affect. Her behavior is normal. Judgment and thought content normal.   Nursing note and vitals reviewed.      Procedures           ED Course  ED Course as of Aug 18 0849   Sun Aug 16, 2020   2055 EKG interpretation regular rate 97 bpm, , normal sinus rhythm, nonspecific ST changes   ECG 12 Lead []   2236 WBC(!): 12.67 []   2251 Scalp wound is hemostatic, no laceration that requires staples    []   2256 Mild leukocytosis, troponin still pending pro BNP not elevated.    []   2257 CRP mildly elevated  CT head without contrast negative for acute intracranial process however there is severe chronic changes pain      []   Mon Aug 17, 2020   0111 Patient has a nonreassuring heart score    []      ED Course  User Index  [JM] Manolo Pierre,                                            Intellectually disabled 55-year-old female with a profound history of uncontrolled seizures reports the emergency department after an unwitnessed fall.  The patient is a poor historian however it would seem that she had an episode of severe chest pain just prior to her fall.  She has multiple cardiac risk factors including obesity and history of CVA suggestive of likely atherosclerosis. HEART score of 4.  Lab evaluation significant for elevated lactate 2.6, CRP elevated 4.38, elevated blood glucose of 225 this is a chronic problem for this patient, mildly elevated liver enzymes at 39, initial troponin negative.  EKG shows nonspecific ST changes.  CT scan of the abdomen shows calcified gallstone in the right upper quadrant, patient is having some nausea and chest pain and this could represent cholelithiasis.  CT of the cervical spine is negative for acute fracture.  X-ray of the chest shows nonspecific findings patient is not having any respiratory difficulty at this time, recommend follow-up x-ray in the future.  CT of the head shows no acute intracranial process and no acute intracranial hemorrhage, posterior scalp wound is noted along with a hematoma.  CT of the head also fairly remarkable for chronic changes after remote ischemic infarct and atrophy.  Given the patient's inability to provide an accurate history, remote multiple risk factors for heart disease, and the possibility of a gallbladder issue she will be admitted to the internal medicine team for further evaluation and management of her multiple symptoms.         MDM  Number of Diagnoses or Management Options  Chest pain, unspecified type:   Fall from ground level:      Amount and/or Complexity of Data Reviewed  Clinical lab tests: ordered and reviewed  Tests in the radiology section of CPT®: ordered and reviewed  Tests in the medicine section of CPT®: ordered and  reviewed  Obtain history from someone other than the patient: yes  Review and summarize past medical records: yes  Discuss the patient with other providers: yes  Independent visualization of images, tracings, or specimens: yes    Risk of Complications, Morbidity, and/or Mortality  Presenting problems: moderate  Diagnostic procedures: moderate  Management options: moderate    Patient Progress  Patient progress: stable      Final diagnoses:   Chest pain, unspecified type   Fall from ground level            Manolo Pierre DO  08/18/20 0849

## 2020-08-17 NOTE — ED NOTES
[facility] called and given update at this time. Advised they would attempt to contact Zev Graves RN  08/17/20 6013

## 2020-08-17 NOTE — PLAN OF CARE
Patient resting in bed with no distress noted at this time. Will continue to monitor and follow plan of care.

## 2020-08-18 LAB
ANION GAP SERPL CALCULATED.3IONS-SCNC: 15 MMOL/L (ref 5–15)
BH CV NUCLEAR PRIOR STUDY: 3
BH CV STRESS BP STAGE 1: NORMAL
BH CV STRESS BP STAGE 2: NORMAL
BH CV STRESS COMMENTS STAGE 1: NORMAL
BH CV STRESS COMMENTS STAGE 2: NORMAL
BH CV STRESS DOSE REGADENOSON STAGE 1: 0.4
BH CV STRESS DURATION MIN STAGE 1: 0
BH CV STRESS DURATION MIN STAGE 2: 4
BH CV STRESS DURATION SEC STAGE 1: 10
BH CV STRESS DURATION SEC STAGE 2: 0
BH CV STRESS HR STAGE 1: 93
BH CV STRESS HR STAGE 2: 102
BH CV STRESS PROTOCOL 1: NORMAL
BH CV STRESS RECOVERY BP: NORMAL MMHG
BH CV STRESS RECOVERY HR: 102 BPM
BH CV STRESS STAGE 1: 1
BH CV STRESS STAGE 2: 2
BUN SERPL-MCNC: 7 MG/DL (ref 6–20)
BUN/CREAT SERPL: 20 (ref 7–25)
CALCIUM SPEC-SCNC: 8.4 MG/DL (ref 8.6–10.5)
CHLORIDE SERPL-SCNC: 108 MMOL/L (ref 98–107)
CO2 SERPL-SCNC: 19 MMOL/L (ref 22–29)
CREAT SERPL-MCNC: 0.35 MG/DL (ref 0.57–1)
CRP SERPL-MCNC: 2.57 MG/DL (ref 0–0.5)
GFR SERPL CREATININE-BSD FRML MDRD: >150 ML/MIN/1.73
GLUCOSE SERPL-MCNC: 214 MG/DL (ref 65–99)
MAXIMAL PREDICTED HEART RATE: 165 BPM
PERCENT MAX PREDICTED HR: 61.82 %
POTASSIUM SERPL-SCNC: 3.3 MMOL/L (ref 3.5–5.2)
SODIUM SERPL-SCNC: 142 MMOL/L (ref 136–145)
STRESS BASELINE BP: NORMAL MMHG
STRESS BASELINE HR: 93 BPM
STRESS PERCENT HR: 73 %
STRESS POST PEAK BP: NORMAL MMHG
STRESS POST PEAK HR: 102 BPM
STRESS TARGET HR: 140 BPM

## 2020-08-18 PROCEDURE — 99225 PR SBSQ OBSERVATION CARE/DAY 25 MINUTES: CPT | Performed by: HOSPITALIST

## 2020-08-18 PROCEDURE — G0378 HOSPITAL OBSERVATION PER HR: HCPCS

## 2020-08-18 PROCEDURE — 86140 C-REACTIVE PROTEIN: CPT | Performed by: HOSPITALIST

## 2020-08-18 PROCEDURE — 97116 GAIT TRAINING THERAPY: CPT

## 2020-08-18 PROCEDURE — U0004 COV-19 TEST NON-CDC HGH THRU: HCPCS | Performed by: HOSPITALIST

## 2020-08-18 PROCEDURE — 92610 EVALUATE SWALLOWING FUNCTION: CPT | Performed by: SPEECH-LANGUAGE PATHOLOGIST

## 2020-08-18 PROCEDURE — 94799 UNLISTED PULMONARY SVC/PX: CPT

## 2020-08-18 PROCEDURE — 25010000002 HEPARIN (PORCINE) PER 1000 UNITS: Performed by: HOSPITALIST

## 2020-08-18 PROCEDURE — 96361 HYDRATE IV INFUSION ADD-ON: CPT

## 2020-08-18 PROCEDURE — U0002 COVID-19 LAB TEST NON-CDC: HCPCS | Performed by: HOSPITALIST

## 2020-08-18 PROCEDURE — 25010000002 CEFTRIAXONE PER 250 MG: Performed by: HOSPITALIST

## 2020-08-18 PROCEDURE — 97530 THERAPEUTIC ACTIVITIES: CPT

## 2020-08-18 PROCEDURE — 80048 BASIC METABOLIC PNL TOTAL CA: CPT | Performed by: HOSPITALIST

## 2020-08-18 PROCEDURE — 96366 THER/PROPH/DIAG IV INF ADDON: CPT

## 2020-08-18 PROCEDURE — 96372 THER/PROPH/DIAG INJ SC/IM: CPT

## 2020-08-18 RX ORDER — POTASSIUM CHLORIDE 20 MEQ/1
40 TABLET, EXTENDED RELEASE ORAL EVERY 4 HOURS
Status: COMPLETED | OUTPATIENT
Start: 2020-08-18 | End: 2020-08-18

## 2020-08-18 RX ORDER — HEPARIN SODIUM 5000 [USP'U]/ML
5000 INJECTION, SOLUTION INTRAVENOUS; SUBCUTANEOUS EVERY 8 HOURS SCHEDULED
Status: DISCONTINUED | OUTPATIENT
Start: 2020-08-18 | End: 2020-08-20 | Stop reason: HOSPADM

## 2020-08-18 RX ADMIN — PAROXETINE HYDROCHLORIDE 30 MG: 30 TABLET, FILM COATED ORAL at 10:04

## 2020-08-18 RX ADMIN — RAMIPRIL 2.5 MG: 2.5 CAPSULE ORAL at 10:03

## 2020-08-18 RX ADMIN — RUFINAMIDE 400 MG: 400 TABLET, FILM COATED ORAL at 20:44

## 2020-08-18 RX ADMIN — RUFINAMIDE 400 MG: 400 TABLET, FILM COATED ORAL at 10:06

## 2020-08-18 RX ADMIN — SODIUM CHLORIDE, PRESERVATIVE FREE 10 ML: 5 INJECTION INTRAVENOUS at 10:04

## 2020-08-18 RX ADMIN — QUETIAPINE FUMARATE 100 MG: 100 TABLET ORAL at 13:40

## 2020-08-18 RX ADMIN — TOPIRAMATE 200 MG: 100 TABLET, FILM COATED ORAL at 10:03

## 2020-08-18 RX ADMIN — QUETIAPINE FUMARATE 100 MG: 100 TABLET ORAL at 05:52

## 2020-08-18 RX ADMIN — ASPIRIN 81 MG: 81 TABLET, CHEWABLE ORAL at 10:04

## 2020-08-18 RX ADMIN — RAMIPRIL 5 MG: 2.5 CAPSULE ORAL at 20:44

## 2020-08-18 RX ADMIN — SODIUM CHLORIDE, PRESERVATIVE FREE 10 ML: 5 INJECTION INTRAVENOUS at 20:43

## 2020-08-18 RX ADMIN — POTASSIUM CHLORIDE 40 MEQ: 20 TABLET, EXTENDED RELEASE ORAL at 18:17

## 2020-08-18 RX ADMIN — Medication 1 CAPSULE: at 10:03

## 2020-08-18 RX ADMIN — DIAZEPAM 5 MG: 5 TABLET ORAL at 10:04

## 2020-08-18 RX ADMIN — DIAZEPAM 5 MG: 5 TABLET ORAL at 20:44

## 2020-08-18 RX ADMIN — CEFTRIAXONE 2 G: 2 INJECTION, POWDER, FOR SOLUTION INTRAMUSCULAR; INTRAVENOUS at 22:05

## 2020-08-18 RX ADMIN — HEPARIN SODIUM 5000 UNITS: 5000 INJECTION INTRAVENOUS; SUBCUTANEOUS at 13:40

## 2020-08-18 RX ADMIN — POTASSIUM CHLORIDE 40 MEQ: 20 TABLET, EXTENDED RELEASE ORAL at 22:05

## 2020-08-18 RX ADMIN — FUROSEMIDE 20 MG: 20 TABLET ORAL at 10:04

## 2020-08-18 RX ADMIN — HEPARIN SODIUM 5000 UNITS: 5000 INJECTION INTRAVENOUS; SUBCUTANEOUS at 20:43

## 2020-08-18 RX ADMIN — TOPIRAMATE 200 MG: 100 TABLET, FILM COATED ORAL at 20:44

## 2020-08-18 RX ADMIN — GABAPENTIN 300 MG: 300 CAPSULE ORAL at 20:44

## 2020-08-18 RX ADMIN — QUETIAPINE FUMARATE 200 MG: 100 TABLET ORAL at 20:44

## 2020-08-18 RX ADMIN — PHENYTOIN SODIUM 200 MG: 100 CAPSULE, EXTENDED RELEASE ORAL at 20:44

## 2020-08-18 NOTE — PROGRESS NOTES
[facility] HOSPITALIST PROGRESS NOTE     Patient Identification:  Name:  Marguerite Brush  Age:  55 y.o.  Sex:  female  :  1965  MRN:  [id number]  Visit Number:  [id number]  ROOM: [address] [place] [address]     Primary Care Provider:  Alonso Mendez MD    Length of stay:  0    Subjective        Chief Compliant Follow up for the chest pain    Patient seen and examined this afternoon with no family at bedside.  Doing fine. Currently denies any chest pain.  Is able to answer in yes and no.  Denies any headache, abdominal pain.  Afebrile no events overnight.  On room air.       Objective     Current Hospital Meds:    aspirin 81 mg Oral Daily   cefTRIAXone 2 g Intravenous Q24H   [START ON 2020] cholecalciferol 50,000 Units Oral Weekly   diazePAM 5 mg Oral BID   gabapentin 300 mg Oral Nightly   heparin (porcine) 5,000 Units Subcutaneous Q8H   lactobacillus acidophilus 1 capsule Oral Daily   [START ON 2020] metFORMIN 500 mg Oral BID With Meals   PARoxetine 30 mg Oral Daily   phenytoin 200 mg Oral Nightly   potassium chloride 40 mEq Oral Q4H   QUEtiapine 100 mg Oral BID   QUEtiapine 200 mg Oral Nightly   ramipril 2.5 mg Oral Daily   ramipril 5 mg Oral Nightly   rufinamide 400 mg Oral BID   sodium chloride 10 mL Intravenous Q12H   topiramate 200 mg Oral BID      ----------------------------------------------------------------------------------------------------------------------  Vital Signs:  Temp:  [98.2 °F (36.8 °C)-98.6 °F (37 °C)] 98.6 °F (37 °C)  Heart Rate:  [89-98] 91  Resp:  [16-20] 18  BP: ()/(45-81) 136/77  SpO2:  [93 %-97 %] 97 %  on   ;   Device (Oxygen Therapy): room air  Body mass index is 34.08 kg/m².    Wt Readings from Last 3 Encounters:   20 79.2 kg (174 lb 8 oz)       Intake/Output Summary (Last 24 hours) at 2020 1804  Last data filed at 2020 1337  Gross per 24 hour   Intake 2080.27 ml   Output --   Net 2080.27 ml     Diet Soft Texture; Ground; Thin; Consistent  Carbohydrate  ----------------------------------------------------------------------------------------------------------------------  Physical exam:  General: Comfortable, Not in distress.  Well-developed and well-nourished.   HENT:  Head:  Normocephalic and atraumatic.  Mouth:  Moist mucous membranes.    Eyes:  Conjunctivae and EOM are normal.  Pupils are equal, round, and reactive to light.  No scleral icterus.    Neck:  Neck supple.  No JVD present.  trachea midline.   Cardiovascular:  Normal rate, regular rhythm with no murmur.  Pulmonary/Chest:  No respiratory distress, no wheezes, no crackles, with normal breath sounds and good air movement. No chest wall tenderness.   Abdomen:  Soft.  Bowel sounds are normal.  No distension and no tenderness.   Musculoskeletal:  No edema, no tenderness, and no deformity.  No red or swollen joints anywhere.    Neurological:  Alert, answering in yes and no. No focal deficits. No facial droop.   Skin:  Skin is warm and dry. No rash noted. No pallor.   Peripheral vascular: pulses in all 4 extremities with no clubbing, no cyanosis, no edema.  Genitourinary: no rosen  ----------------------------------------------------------------------------------------------------------------------  ----------------------------------------------------------------------------------------------------------------------  Results from last 7 days   Lab Units 08/18/20 0116 08/17/20 0523 08/17/20 0421 08/17/20 0255 08/16/20  2225   CRP mg/dL 2.57* 3.76*  --   --  4.38*   LACTATE mmol/L  --   --   --  1.2 2.6*   WBC 10*3/mm3  --   --  9.93  --  12.67*   HEMOGLOBIN g/dL  --   --  14.0  --  14.4   HEMATOCRIT %  --   --  43.5  --  43.4   MCV fL  --   --  99.8*  --  96.7   MCHC g/dL  --   --  32.2  --  33.2   PLATELETS 10*3/mm3  --   --  194  --  208   INR   --   --   --   --  1.00     Results from last 7 days   Lab Units 08/18/20 0116 08/17/20 0523 08/17/20 0421 08/16/20  2225   SODIUM mmol/L 142   --  141 137   POTASSIUM mmol/L 3.3*  --  3.9 4.6   MAGNESIUM mg/dL  --  1.9  --   --    CHLORIDE mmol/L 108*  --  106 101   CO2 mmol/L 19.0*  --  24.5 23.2   BUN mg/dL 7  --  9 11   CREATININE mg/dL 0.35*  --  0.55* 0.67   PHOSPHORUS mg/dL  --  3.3  --   --    EGFR IF NONAFRICN AM mL/min/1.73 >150  --  115 91   CALCIUM mg/dL 8.4*  --  8.8 9.2   GLUCOSE mg/dL 214*  --  166* 225*   ALBUMIN g/dL  --   --  4.00 4.03   BILIRUBIN mg/dL  --   --  0.3 0.2   ALK PHOS U/L  --   --  158* 165*   AST (SGOT) U/L  --   --  20 39*   ALT (SGPT) U/L  --   --  30 32   Estimated Creatinine Clearance: 169.2 mL/min (A) (by C-G formula based on SCr of 0.35 mg/dL (L)).  Results from last 7 days   Lab Units 08/17/20  0809 08/17/20  0351 08/17/20  0111   TROPONIN T ng/mL <0.010 <0.010 <0.010     Hemoglobin A1C   Date/Time Value Ref Range Status   08/17/2020 0421 6.80 (H) 4.80 - 5.60 % Final     No results found for: AMMONIA  Results from last 7 days   Lab Units 08/17/20  0421   CHOLESTEROL mg/dL 164   TRIGLYCERIDES mg/dL 110   HDL CHOL mg/dL 59   LDL CHOL mg/dL 83     Results from last 7 days   Lab Units 08/17/20  1556   NITRITE UA  Negative   WBC UA /HPF 31-50*   BACTERIA UA /HPF 4+*   SQUAM EPITHEL UA /HPF 3-6*   URINECX  Growth present, too young to evaluate     No results found for: BLOODCXNo results found for: RESPCXNo results found for: URINECXNo results found for: WOUNDCXNo results found for: BODYFLDCXNo results found for: STOOLCX  I have personally looked at the labs and they are summarized above.  ----------------------------------------------------------------------------------------------------------------------  Imaging Results (Last 24 Hours)     ** No results found for the last 24 hours. **        I have personally reviewed the radiology images and read the final radiology report.    Assessment & Plan      Assessment:  Chest pain post fall  Sepsis secondary to Acute UTI  New DM2 with A1C 6.8  Essential HTN  COPD  Cholelithiasis  2.2 cm  H/O CVA  H/O Seizure  Intellectual Disability  H/O recent fall  Obesity      Plan:  Chest pain post fall, troponin negative.  Nuclear stress test done this morning.  Report pending. On ASA.  Lipid panel done normal.     Sepsis secondary to Acute UTI, UA abnormal.  on Rocephin and lactobacillus.  urine culture pending.  Currently patient is afebrile VSS.  Leukocytosis resolved and CRP improving.  Prelim blood cultures are no growth.  DC IV fluids.    New DM2 with A1C 6.8, on carb consistent diet. Will start on metformin tomorrow.     Essential HTN, Fluctuating so will dc lasix. On ramipril.     COPD, stable.  Monitor    Cholelithiasis 2.2 cm, outpatient follow-up.    H/O Seizure, stable.  On phenytoin.  Phenytoin level normal.  Heparin sc for the DVT prophylaxis.     Discharge to nursing home in am. COVID test done.      Management plan discussed in detail with nursing and patient.      The patient is high risk due to the following diagnoses/reasons: Chest pain post fall, Sepsis secondary to Acute UTI  New DM2 with A1C 6.8    Kyara Browning MD  08/18/20  18:04

## 2020-08-18 NOTE — THERAPY EVALUATION
Acute Care - Speech Language Pathology   Swallow Initial Evaluation [facility]   CLINICAL DYSPHAGIA ASSESSMENT     Patient Name: Marguerite Brush  : 1965  MRN: [id number]  Today's Date: 2020  Onset of Illness/Injury or Date of Surgery: 20     Referring Physician: Dr. Valdez      Admit Date: 2020  Marguerite Brush  is seen at bedside this am on [id number]  to assess safety/efficacy of swallowing fnx, determine safest/least restrictive diet tolerance. She has significant h/o CVA, seizures and intellectual disability.    Social History     Socioeconomic History   • Marital status: Single     Spouse name: Not on file   • Number of children: Not on file   • Years of education: Not on file   • Highest education level: Not on file   Tobacco Use   • Smoking status: Never Smoker   • Smokeless tobacco: Never Used        IMAGING:  CT Abdomen W     INDICATION:   Abdominal pain tonight with suspected colitis or gastroenteritis.     TECHNIQUE:   CT of the abdomen with IV contrast. Coronal and sagittal reconstructions were obtained.  Radiation dose reduction techniques included automated exposure control or exposure modulation based on body size. Count of known CT and cardiac nuc med studies  performed in previous 12 months: 0.      COMPARISON:   None available.     FINDINGS:  Lung bases are clear. The liver, spleen, pancreas, adrenal glands and kidneys are normal. The gallbladder contains a calcified stone measuring 2.2 cm in diameter. The bowel is normal. There is no evidence of bowel obstruction or colitis. Aorta is normal  in size. There is no adenopathy. Bones are unremarkable.     IMPRESSION:  2.2 cm gallstone     No evidence of colitis or gastroenteritis. No evidence of obstruction              Signer Name: Delta Quinonez MD   Signed: 2020 11:43 PM   Workstation Name: [id number]    [facility]    Diet Orders (active) (From admission, onward)     Start     Ordered    20 9007   Diet Soft Texture; Ground; Thin; Consistent Carbohydrate  Diet Effective Now      08/18/20 1135                She is observed on ra w/o complications, tolerating well.    Pt is positioned upright and centered in bed to accept multiple po presentations of ice chips solid cracker, puree and thin liquids via spoon, cup and straw.  Pt is able to self feed.     Facial/oral structures are symmetrical upon observation. CNA lingual protrusion as pt is unable to perform despite max cues and prompts. Oral mucosa are moist, pink, and clean. Secretions are clear, thin, and well controlled. OROM/NATALYA is generally delayed/weak to imitate oral postures. Gag is not assessed. Volitional cough CNA as pt is unable to perform upon command. Voice is adequate in intensity, clear in quality w/ intelligible speech. Pt communicates w/ primarily one word responses to communicate wants and needs.     Upon po presentations, adequate bolus anticipation and acceptance w/ good labial seal for bolus clearance via spoon bowl, cup rim stability and suction via straw. Bolus formation, manipulation and control are generally weak w/ increased oral prep time w/ mixed phasic/rotary mastication pattern. A-p transit is delayed w/ mild oral residue w/ rosalind cracker. Requires alternating liquids/solids to clear remaining residue from oral cavity.    Pharyngeal swallow is timely w/ adequate hyolaryngeal elevation per palpation. No overt s/s aspiration evidenced across this evaluation. No silent aspiration suspected as pt is w/o changes in vocal quality, respirations or secretions post po presentations. Pt denies odynophagia.      Visit Dx:     ICD-10-CM ICD-9-CM   1. Chest pain, unspecified type R07.9 786.50   2. Fall from ground level W18.30XA E888.9     Patient Active Problem List   Diagnosis   • Chest pain   • History of CVA (cerebrovascular accident)   • History of seizures   • Intellectual disability     Past Medical History:   Diagnosis Date   • COPD  (chronic obstructive pulmonary disease) (CMS/[facility])    • History of CVA (cerebrovascular accident) 8/17/2020   • History of seizures 8/17/2020   • Intellectual disability 8/17/2020     No past surgical history on file.      EDUCATION  The patient has been educated in the following areas:   Dysphagia (Swallowing Impairment) Oral Care/Hydration Modified Diet Instruction.    SLP Recommendation and Plan    Impression: Per this assessment Ms. Brush presents w/ a mildly impaired oral phase, wfl pharyngeal phase swallowing fnx. Increased oral prep time w/ solid cracker w/ mixed phasic/rotary mastication pattern. A-p transit is delayed w/ mild oral residue w/ solid cracker. No overt s/s of aspiration. No s/s concerning for silent aspiration.    She is felt to most benefit from modified po diet of mechanical soft, ground w/ thin liquids. Meds whole in puree. Crushed prn. Upright and centered for all po intake.     Recommendations:  1. Mechanical soft, ground w/ thin liquids.  2. Meds whole in puree. Crushed prn.  3. Upright and centered for all po intake  4. SIDDHARTH precautions.  5. Oral care protocol.    SLP to f/u for diet safety/acceptance.    D/w pt results and recommendations w/ verbal agreement.    D/w RN results and recommendations w/ verbal agreement.    Thank you for allowing me to participate in the care of your patient-  Paz Callahan M.S., CCC-SLP   Time Calculation:   Time Calculation- SLP     Time Calculation- SLP     Row Name 08/18/20 1143    SLP Received On  08/18/20  -Recorded by: SETH    SLP - Next Appointment  08/19/20  -Recorded by: SETH            User Key     Initials Name Provider Type    Paz Yusuf MS CCC-SLP Speech and Language Pathologist          Therapy Charges for Today     Code Description Service Date Service Provider Modifiers Qty    [id number] HC ST EVAL ORAL PHARYNG SWALLOW 4 8/18/2020 Paz Callahan MS CCC-SLP GN 1               Paz Callahan MS CCC-FERNANDO  8/18/2020

## 2020-08-18 NOTE — THERAPY TREATMENT NOTE
Acute Care - Physical Therapy Treatment Note   [place]     Patient Name: Marguerite Brush  : 1965  MRN: [id number]  Today's Date: 2020  Onset of Illness/Injury or Date of Surgery: 20  Date of Referral to PT: 20  Referring Physician: Dr. Valdez    Admit Date: 2020    Visit Dx:    ICD-10-CM ICD-9-CM   1. Chest pain, unspecified type R07.9 786.50   2. Fall from ground level W18.30XA E888.9     Patient Active Problem List   Diagnosis   • Chest pain   • History of CVA (cerebrovascular accident)   • History of seizures   • Intellectual disability       Therapy Treatment    Rehabilitation Treatment Summary     Row Name 20             Treatment Time/Intention    Discipline  physical therapist  -AG      Document Type  therapy note (daily note)  -AG      Subjective Information  no complaints  -AG      Mode of Treatment  individual therapy;physical therapy  -AG      Patient/Family Observations  patient supine, more groggy today, but agreeable to participation.   -AG      Care Plan Review  care plan/treatment goals reviewed;risks/benefits reviewed  -AG      Patient Effort  adequate  -AG      Existing Precautions/Restrictions  fall;seizures  -AG      Patient Response to Treatment  patient gave fair effort and was able take 5-6 small sidesteps to L toward HOB again. Pt. sat several times suddenly and without warning and so performed several reps of sit<>stand.   -AG      Recorded by [AG] Nancy Moore, PT 20      Row Name 20 162             Cognitive Assessment/Intervention- PT/OT    Orientation Status (Cognition)  oriented to;person;place  -AG      Follows Commands (Cognition)  follows one step commands;physical/tactile prompts required;repetition of directions required;verbal cues/prompting required  -AG      Safety Deficit (Cognitive)  moderate deficit;severe deficit  -AG      Recorded by [AG] Nancy Moore, PT 20      Row Name 20              Safety Issues, Functional Mobility    Safety Issues Affecting Function (Mobility)  ability to follow commands;insight into deficits/self awareness;safety precaution awareness;safety precautions follow-through/compliance  -AG      Impairments Affecting Function (Mobility)  balance;cognition;strength  -AG      Recorded by [AG] Nancy Moore, PT 08/18/20 1628      Row Name 08/18/20 1620             Mobility Assessment/Intervention    Left Lower Extremity (Weight-bearing Status)  full weight-bearing (FWB)  -AG      Right Lower Extremity (Weight-bearing Status)  full weight-bearing (FWB)  -AG      Recorded by [AG] Nancy Moore, PT 08/18/20 1628      Row Name 08/18/20 1620             Bed Mobility Assessment/Treatment    Bed Mobility Assessment/Treatment  scooting/bridging;supine-sit;sit-supine  -AG      Scooting/Bridging [place] (Bed Mobility)  verbal cues;nonverbal cues (demo/gesture);maximum assist (25% patient effort)  -AG      Supine-Sit [place] (Bed Mobility)  verbal cues;nonverbal cues (demo/gesture);maximum assist (25% patient effort)  -AG      Sit-Supine [place] (Bed Mobility)  verbal cues;nonverbal cues (demo/gesture);moderate assist (50% patient effort);maximum assist (25% patient effort)  -AG      Bed Mobility, Safety Issues  cognitive deficits limit understanding;decreased use of arms for pushing/pulling;decreased use of legs for bridging/pushing;impaired trunk control for bed mobility  -AG      Assistive Device (Bed Mobility)  bed rails;draw sheet  -AG      Recorded by [AG] Nancy Moore, PT 08/18/20 1628      Row Name 08/18/20 1620             Transfer Assessment/Treatment    Transfer Assessment/Treatment  sit-stand transfer;stand-sit transfer  -AG      Recorded by [AG] Nancy Moore, PT 08/18/20 1628      Row Name 08/18/20 1620             Sit-Stand Transfer    Sit-Stand [place] (Transfers)  verbal cues;nonverbal cues (demo/gesture);moderate assist (50% patient effort)  -AG       Assistive Device (Sit-Stand Transfers)  walker, front-wheeled  -AG      Recorded by [AG] Nancy Moore, PT 08/18/20 1628      Row Name 08/18/20 1620             Stand-Sit Transfer    Stand-Sit [place] (Transfers)  verbal cues;nonverbal cues (demo/gesture);moderate assist (50% patient effort)  -AG      Assistive Device (Stand-Sit Transfers)  walker, front-wheeled  -AG      Recorded by [AG] Nancy Moore, PT 08/18/20 1628      Row Name 08/18/20 1620             Gait/Stairs Assessment/Training    Gait/Stairs Assessment/Training  gait/ambulation independence;gait/ambulation assistive device;distance ambulated;gait pattern;gait deviations;maintains weight-bearing status  -AG      [place] Level (Gait)  verbal cues;nonverbal cues (demo/gesture);moderate assist (50% patient effort)  -AG      Assistive Device (Gait)  walker, front-wheeled  -AG      Distance in Feet (Gait)  5-6 sidesteps to L toward HOB  -AG      Pattern (Gait)  -- no forward ambulation attempted d/t safety risk.   -AG      Recorded by [AG] Nancy Moore, PT 08/18/20 1628      Row Name 08/18/20 1620             Motor Skills Assessment/Interventions    Additional Documentation  Balance (Group);Balance Interventions (Group);Therapeutic Exercise (Group);Therapeutic Exercise Interventions (Group)  -AG      Recorded by [AG] Nancy Moore, PT 08/18/20 1628      Row Name 08/18/20 1620             Therapeutic Exercise    Therapeutic Exercise  seated, lower extremities  -AG      Additional Documentation  Therapeutic Exercise (Row)  -AG      Recorded by [AG] Nancy Moore, PT 08/18/20 1628      Row Name 08/18/20 1620             Lower Extremity Seated Therapeutic Exercise    Performed, Seated Lower Extremity (Therapeutic Exercise)  LAQ (long arc quad), knee extension  -AG      Exercise Type, Seated Lower Extremity (Therapeutic Exercise)  AAROM (active assistive range of motion);AROM (active range of motion)  -AG      Expected Outcomes, Seated Lower  Extremity (Therapeutic Exercise)  improve functional tolerance, community activity;improve functional tolerance, household activity;improve functional tolerance, self-care activity;improve performance, gait skills;improve performance, transfer skills;strengthen normal movement patterns;strengthen, facilitate independent active range of motion  -AG      Sets/Reps Detail, Seated Lower Extremity (Therapeutic Exercise)  1 x 5  -AG      Comment, Seated Lower Extremity (Therapeutic Exercise)  limited by cognition  -AG      Recorded by [AG] Nancy Moore, PT 08/18/20 1628      Row Name 08/18/20 1620             Balance    Balance  static sitting balance;static standing balance;dynamic sitting balance;dynamic standing balance  -AG      Recorded by [AG] Nancy Moore, PT 08/18/20 1628      Row Name 08/18/20 1620             Static Sitting Balance    Level of [place] (Unsupported Sitting, Static Balance)  minimal assist, 75% patient effort  -AG      Sitting Position (Unsupported Sitting, Static Balance)  sitting on edge of bed leaning posteriorly and to R  -AG      Time Able to Maintain Position (Unsupported Sitting, Static Balance)  more than 5 minutes  -AG      Recorded by [AG] Nancy Moore, PT 08/18/20 1628      Row Name 08/18/20 1620             Static Standing Balance    Level of [place] (Supported Standing, Static Balance)  minimal assist, 75% patient effort  -AG      Assistive Device Utilized (Supported Standing, Static Balance)  walker, rolling  -AG      Recorded by [AG] Nancy Moore, PT 08/18/20 1628      Row Name 08/18/20 1620             Dynamic Standing Balance    Level of [place], Reaches Outside Midline (Standing, Dynamic Balance)  moderate assist, 50 to 74% patient effort  -AG      Assistive Device Utilized (Supported Standing, Dynamic Balance)  walker, rolling  -AG      Recorded by [AG] Nancy Moore, PT 08/18/20 1628      Row Name 08/18/20 1620             Positioning and Restraints     Pre-Treatment Position  in bed  -AG      Post Treatment Position  bed  -AG      In Bed  supine;call light within reach;encouraged to call for assist;exit alarm on;side rails up x3  -AG      Recorded by [AG] Nancy Moore, PT 08/18/20 1628      Row Name 08/18/20 1620             Pain Scale: FACES Pre/Post-Treatment    Pain: FACES Scale, Pretreatment  0-->no hurt  -AG      Pain: FACES Scale, Post-Treatment  0-->no hurt  -AG      Recorded by [AG] Nancy Moore, PT 08/18/20 1628      Row Name                Wound 08/17/20 0451 medial;proximal coccyx Fissure    Wound - Properties Group Date first assessed: 08/17/20 [AM] Time first assessed: 0451 [AM] Present on Hospital Admission: Y [AM] Orientation: medial;proximal [AM] Location: coccyx [AM] Primary Wound Type: Fissure [AM] Recorded by:  [AM] Harshad Dickinson, RN 08/17/20 0452    Row Name 08/18/20 1620             Coping    Observed Emotional State  accepting;calm  -AG      Verbalized Emotional State  acceptance  -AG      Recorded by [AG] Nancy Moore, PT 08/18/20 1628      Row Name 08/18/20 1620             Plan of Care Review    Plan of Care Reviewed With  patient  -AG      Progress  improving  -AG      Recorded by [AG] Nancy Moore, PT 08/18/20 1628      Row Name 08/18/20 1620             Outcome Summary/Treatment Plan (PT)    Daily Summary of Progress (PT)  progress towards functional goals is fair  -AG      Barriers to Overall Progress (PT)  cognition, decr strength, balance  -AG      Plan for Continued Treatment (PT)  continue per POC  -AG      Anticipated Discharge Disposition (PT)  extended care facility  -AG      Recorded by [AG] Nancy Moore, PT 08/18/20 1628        User Key  (r) = Recorded By, (t) = Taken By, (c) = Cosigned By    Initials Name Effective Dates Discipline    AG Nancy Moore, PT 04/03/18 -  PT    Harshad Escamilla, RN 06/17/20 -  Nurse          Wound 08/17/20 0451 medial;proximal coccyx Fissure (Active)   Dressing Appearance open to  air 8/18/2020 10:00 AM   Closure Open to air 8/18/2020 10:00 AM   Base pink 8/18/2020 10:00 AM   Drainage Amount none 8/18/2020 10:00 AM   Dressing Care, Wound open to air 8/18/2020 10:00 AM           Physical Therapy Education                 Title: PT OT SLP Therapies (In Progress)     Topic: Physical Therapy (In Progress)     Point: Mobility training (In Progress)     Description:   Instruct learner(s) on safety and technique for assisting patient out of bed, chair or wheelchair.  Instruct in the proper use of assistive devices, such as walker, crutches, cane or brace.              Patient Friendly Description:   It's important to get you on your feet again, but we need to do so in a way that is safe for you. Falling has serious consequences, and your personal safety is the most important thing of all.        When it's time to get out of bed, one of us or a family member will sit next to you on the bed to give you support.     If your doctor or nurse tells you to use a walker, crutches, a cane, or a brace, be sure you use it every time you get out of bed, even if you think you don't need it.    Learning Progress Summary           Patient Acceptance, E,D, NR,NL by AG at 8/18/2020 1620     by AG at 8/18/2020 1618                   Point: Home exercise program (In Progress)     Description:   Instruct learner(s) on appropriate technique for monitoring, assisting and/or progressing patient with therapeutic exercises and activities.              Learning Progress Summary           Patient Acceptance, E,D, NR,NL by AG at 8/18/2020 1620     by AG at 8/18/2020 1618                   Point: Body mechanics (In Progress)     Description:   Instruct learner(s) on proper positioning and spine alignment for patient and/or caregiver during mobility tasks and/or exercises.              Learning Progress Summary           Patient Acceptance, E,D, NR,NL by AG at 8/18/2020 1620     by AG at 8/18/2020 1618                   Point:  Precautions (In Progress)     Description:   Instruct learner(s) on prescribed precautions during mobility and gait tasks              Learning Progress Summary           Patient Acceptance, E,D, NR,NL by  at 8/18/2020 1620     by  at 8/18/2020 1618                               User Key     Initials Effective Dates Name Provider Type [facility] 04/03/18 -  Nancy Moore, PT Physical Therapist PT                PT Recommendation and Plan  Anticipated Discharge Disposition (PT): extended care facility  Planned Therapy Interventions (PT Eval): balance training, bed mobility training, gait training, home exercise program, patient/family education, strengthening, transfer training  Therapy Frequency (PT Clinical Impression): other (see comments)(3-5 times/ week per priority)  Outcome Summary/Treatment Plan (PT)  Daily Summary of Progress (PT): progress towards functional goals is fair  Barriers to Overall Progress (PT): cognition, decr strength, balance  Plan for Continued Treatment (PT): continue per POC  Anticipated Equipment Needs at Discharge (PT): (TBD)  Anticipated Discharge Disposition (PT): extended care facility  Plan of Care Reviewed With: patient  Progress: improving     Time Calculation:   PT Charges     Row Name 08/18/20 1618             Time Calculation- PT    Total Timed Code Minutes- PT  30 minute(s)  -        User Key  (r) = Recorded By, (t) = Taken By, (c) = Cosigned By    Initials Name Provider Type     Nancy Moore, PT Physical Therapist        Therapy Charges for Today     Code Description Service Date Service Provider Modifiers Qty    20931514039  PT AQUA EVAL MOD COMPLEXITY 4 8/17/2020 Nancy Moore, PT GP 1    [id number] HC GAIT TRAINING EA 15 MIN 8/18/2020 Nancy Moore, PT GP 1    [id number]  PT THERAPEUTIC ACT EA 15 MIN 8/18/2020 Nancy Moore, PT GP 1               Nancy Moore PT  8/18/2020

## 2020-08-18 NOTE — PLAN OF CARE
Clinical dysphagia assessment completed. No overt s/s of aspiration. No s/s concerning for silent aspiration. Will modify po diet to mechanical soft, ground w/ thin liquids. SLP to f/u for diet safety/acceptance.   Problem: Patient Care Overview  Goal: Plan of Care Review  Outcome: Ongoing (interventions implemented as appropriate)  Flowsheets (Taken 8/18/2020 0213 by Sirisha Michaud, RN)  Plan of Care Reviewed With: patient

## 2020-08-19 LAB
ANION GAP SERPL CALCULATED.3IONS-SCNC: 9.2 MMOL/L (ref 5–15)
BACTERIA SPEC AEROBE CULT: ABNORMAL
BUN SERPL-MCNC: 8 MG/DL (ref 6–20)
BUN/CREAT SERPL: 15.7 (ref 7–25)
CALCIUM SPEC-SCNC: 8.3 MG/DL (ref 8.6–10.5)
CHLORIDE SERPL-SCNC: 113 MMOL/L (ref 98–107)
CO2 SERPL-SCNC: 20.8 MMOL/L (ref 22–29)
CREAT SERPL-MCNC: 0.51 MG/DL (ref 0.57–1)
GFR SERPL CREATININE-BSD FRML MDRD: 125 ML/MIN/1.73
GLUCOSE SERPL-MCNC: 162 MG/DL (ref 65–99)
MAGNESIUM SERPL-MCNC: 2.1 MG/DL (ref 1.6–2.6)
POTASSIUM SERPL-SCNC: 4.5 MMOL/L (ref 3.5–5.2)
REF LAB TEST METHOD: NORMAL
SARS-COV-2 RNA RESP QL NAA+PROBE: NOT DETECTED
SODIUM SERPL-SCNC: 143 MMOL/L (ref 136–145)

## 2020-08-19 PROCEDURE — 83735 ASSAY OF MAGNESIUM: CPT | Performed by: HOSPITALIST

## 2020-08-19 PROCEDURE — 96372 THER/PROPH/DIAG INJ SC/IM: CPT

## 2020-08-19 PROCEDURE — 80048 BASIC METABOLIC PNL TOTAL CA: CPT | Performed by: HOSPITALIST

## 2020-08-19 PROCEDURE — 92526 ORAL FUNCTION THERAPY: CPT | Performed by: SPEECH-LANGUAGE PATHOLOGIST

## 2020-08-19 PROCEDURE — 99225 PR SBSQ OBSERVATION CARE/DAY 25 MINUTES: CPT | Performed by: HOSPITALIST

## 2020-08-19 PROCEDURE — 94799 UNLISTED PULMONARY SVC/PX: CPT

## 2020-08-19 PROCEDURE — G0378 HOSPITAL OBSERVATION PER HR: HCPCS

## 2020-08-19 PROCEDURE — 99244 OFF/OP CNSLTJ NEW/EST MOD 40: CPT | Performed by: NURSE PRACTITIONER

## 2020-08-19 PROCEDURE — 25010000002 HEPARIN (PORCINE) PER 1000 UNITS: Performed by: HOSPITALIST

## 2020-08-19 PROCEDURE — 25010000002 CEFTRIAXONE PER 250 MG: Performed by: HOSPITALIST

## 2020-08-19 RX ORDER — ATORVASTATIN CALCIUM 20 MG/1
20 TABLET, FILM COATED ORAL NIGHTLY
Status: DISCONTINUED | OUTPATIENT
Start: 2020-08-19 | End: 2020-08-20 | Stop reason: HOSPADM

## 2020-08-19 RX ORDER — AMOXICILLIN 250 MG
2 CAPSULE ORAL DAILY
Status: DISCONTINUED | OUTPATIENT
Start: 2020-08-20 | End: 2020-08-20 | Stop reason: HOSPADM

## 2020-08-19 RX ORDER — ISOSORBIDE DINITRATE 10 MG/1
10 TABLET ORAL
Status: DISCONTINUED | OUTPATIENT
Start: 2020-08-19 | End: 2020-08-20 | Stop reason: HOSPADM

## 2020-08-19 RX ORDER — AMOXICILLIN 250 MG
2 CAPSULE ORAL 2 TIMES DAILY
Status: DISCONTINUED | OUTPATIENT
Start: 2020-08-19 | End: 2020-08-19

## 2020-08-19 RX ORDER — METOPROLOL SUCCINATE 25 MG/1
12.5 TABLET, EXTENDED RELEASE ORAL
Status: DISCONTINUED | OUTPATIENT
Start: 2020-08-19 | End: 2020-08-20 | Stop reason: HOSPADM

## 2020-08-19 RX ADMIN — RUFINAMIDE 400 MG: 400 TABLET, FILM COATED ORAL at 09:05

## 2020-08-19 RX ADMIN — QUETIAPINE FUMARATE 100 MG: 100 TABLET ORAL at 05:33

## 2020-08-19 RX ADMIN — PAROXETINE HYDROCHLORIDE 30 MG: 30 TABLET, FILM COATED ORAL at 09:04

## 2020-08-19 RX ADMIN — HEPARIN SODIUM 5000 UNITS: 5000 INJECTION INTRAVENOUS; SUBCUTANEOUS at 14:59

## 2020-08-19 RX ADMIN — SODIUM CHLORIDE, PRESERVATIVE FREE 10 ML: 5 INJECTION INTRAVENOUS at 22:27

## 2020-08-19 RX ADMIN — DIAZEPAM 5 MG: 5 TABLET ORAL at 09:05

## 2020-08-19 RX ADMIN — Medication 1 CAPSULE: at 09:04

## 2020-08-19 RX ADMIN — RAMIPRIL 5 MG: 2.5 CAPSULE ORAL at 22:26

## 2020-08-19 RX ADMIN — HEPARIN SODIUM 5000 UNITS: 5000 INJECTION INTRAVENOUS; SUBCUTANEOUS at 22:24

## 2020-08-19 RX ADMIN — QUETIAPINE FUMARATE 200 MG: 100 TABLET ORAL at 22:27

## 2020-08-19 RX ADMIN — TOPIRAMATE 200 MG: 100 TABLET, FILM COATED ORAL at 22:23

## 2020-08-19 RX ADMIN — RUFINAMIDE 400 MG: 400 TABLET, FILM COATED ORAL at 22:25

## 2020-08-19 RX ADMIN — ASPIRIN 81 MG: 81 TABLET, CHEWABLE ORAL at 09:04

## 2020-08-19 RX ADMIN — METFORMIN HYDROCHLORIDE 500 MG: 500 TABLET ORAL at 18:12

## 2020-08-19 RX ADMIN — QUETIAPINE FUMARATE 100 MG: 100 TABLET ORAL at 14:59

## 2020-08-19 RX ADMIN — CEFTRIAXONE 2 G: 2 INJECTION, POWDER, FOR SOLUTION INTRAMUSCULAR; INTRAVENOUS at 22:23

## 2020-08-19 RX ADMIN — DIAZEPAM 5 MG: 5 TABLET ORAL at 22:27

## 2020-08-19 RX ADMIN — HEPARIN SODIUM 5000 UNITS: 5000 INJECTION INTRAVENOUS; SUBCUTANEOUS at 05:33

## 2020-08-19 RX ADMIN — ISOSORBIDE DINITRATE 10 MG: 10 TABLET ORAL at 18:12

## 2020-08-19 RX ADMIN — METOPROLOL SUCCINATE 12.5 MG: 25 TABLET, EXTENDED RELEASE ORAL at 11:32

## 2020-08-19 RX ADMIN — SODIUM CHLORIDE, PRESERVATIVE FREE 10 ML: 5 INJECTION INTRAVENOUS at 09:04

## 2020-08-19 RX ADMIN — ATORVASTATIN CALCIUM 20 MG: 20 TABLET, FILM COATED ORAL at 22:24

## 2020-08-19 RX ADMIN — METFORMIN HYDROCHLORIDE 500 MG: 500 TABLET ORAL at 09:04

## 2020-08-19 RX ADMIN — RAMIPRIL 2.5 MG: 2.5 CAPSULE ORAL at 09:04

## 2020-08-19 RX ADMIN — PHENYTOIN SODIUM 200 MG: 100 CAPSULE, EXTENDED RELEASE ORAL at 22:24

## 2020-08-19 RX ADMIN — GABAPENTIN 300 MG: 300 CAPSULE ORAL at 22:27

## 2020-08-19 RX ADMIN — TOPIRAMATE 200 MG: 100 TABLET, FILM COATED ORAL at 09:04

## 2020-08-19 RX ADMIN — DOCUSATE SODIUM 50 MG AND SENNOSIDES 8.6 MG 2 TABLET: 8.6; 5 TABLET, FILM COATED ORAL at 11:33

## 2020-08-19 NOTE — THERAPY TREATMENT NOTE
Acute Care - Speech Language Pathology   Swallow Treatment Note [facility]     Patient Name: Marguerite Brush  : 1965  MRN: [id number]  Today's Date: 2020  Onset of Illness/Injury or Date of Surgery: 20     Referring Physician: Dr. Valdez      Admit Date: 2020    Visit Dx:      ICD-10-CM ICD-9-CM   1. Chest pain, unspecified type R07.9 786.50   2. Fall from ground level W18.30XA E888.9     Patient Active Problem List   Diagnosis   • Chest pain   • History of CVA (cerebrovascular accident)   • History of seizures   • Intellectual disability       Therapy Treatment  Rehabilitation Treatment Summary     Wound 20 0451 medial;proximal coccyx Fissure     Row Name    Wound - Properties Group    Date first assessed: 20 [AM] Time first assessed: 451 [AM] Present on Hospital Admission: Y [AM] Orientation: medial;proximal [AM] Location: coccyx [AM] Primary Wound Type: Fissure [AM] Recorded by:  [AM] Recorded by: Harshad Dickinson RN, Recorded at: 20            User Key     Initials Name Effective Dates Discipline    AM Harshad Dickinson RN 20 -  Nurse         Ms. Brush is seen this am at bedside for diet safety/accepatnce. She is s/p clinical dysphagia assessment yesterday w/ SLP recs for mechanical soft, ground w/ thin liquids. Per chart review pt is tolerating diet well w/o overt s/s of aspiration.    Ms. Brush is awake upon entry, sitting upright in bed w/ tray table in front of her w/ coloring books. She denies any difficulty w/ current level of po intake. She is agreeable to accept po intake for diet safety/acceptance. She accepts multiple presentations of thin liquids via cup and straw, wfl. No overt s/s of aspiration. No s/s concerning for silent aspiration. Pt agreeable to accept trials of crumbled rosalind cracker. Bolus formation, manipulation and control are generally weak w/ increased oral prep time w/ mixed phasic/rotary mastication pattern. A-p transit is delayed  w/ mild oral residue w/ rosalind cracker. Requires alternating liquids/solids to clear remaining residue from oral cavity.    She is felt to most benefit from modified po diet of mechanical soft, ground w/ thin liquids. Meds whole in puree. Crushed prn. Upright and centered for all po intake. This is felt to be most representative of pt's baseline modified po diet. No further SLP f/u warranted at this time.     Thank you-  Paz Callahan M.S., CCC-SLP    Outcome Summary    Continue current modified po diet.      EDUCATION  The patient has been educated in the following areas:   Dysphagia (Swallowing Impairment) Oral Care/Hydration Modified Diet Instruction.    SLP Recommendation and Plan    Recommendations:  1. Mechanical soft, ground w/ thin liquids.  2. Meds whole in puree. Crushed prn.  3. Upright and centered for all po intake  4. SIDDHARTH precautions.  5. Oral care protocol     Time Calculation:       Therapy Charges for Today     Code Description Service Date Service Provider Modifiers Qty    [id number]  ST EVAL ORAL PHARYNG SWALLOW 4 8/18/2020 Paz Callahan, MS CCC-SLP GN 1    [id number]  ST TREATMENT SWALLOW 3 8/19/2020 Paz Callahan, MS CCC-SLP GN 1                 Paz Callahan MS CCC-SLP  8/19/2020

## 2020-08-19 NOTE — PROGRESS NOTES
[facility] HOSPITALIST PROGRESS NOTE     Patient Identification:  Name:  Marguerite Brush  Age:  55 y.o.  Sex:  female  :  1965  MRN:  [id number]  Visit Number:  [id number]  ROOM: [address] [place] [address]     Primary Care Provider:  Alonso Mendez MD    Length of stay:  0    Subjective        Chief Compliant Follow up for the chest pain    Patient seen and examined this afternoon with no family at bedside.  Doing fine. Currently denies any chest pain.  Is able to answer in yes and no.  Denies any headache, abdominal pain.  Afebrile no events overnight.  On room air. Had BM yesterday.       Objective     Current Hospital Meds:    aspirin 81 mg Oral Daily   atorvastatin 20 mg Oral Nightly   cefTRIAXone 2 g Intravenous Q24H   [START ON 2020] cholecalciferol 50,000 Units Oral Weekly   diazePAM 5 mg Oral BID   gabapentin 300 mg Oral Nightly   heparin (porcine) 5,000 Units Subcutaneous Q8H   isosorbide dinitrate 10 mg Oral TID - Nitrates   lactobacillus acidophilus 1 capsule Oral Daily   metFORMIN 500 mg Oral BID With Meals   metoprolol succinate XL 12.5 mg Oral Q24H   PARoxetine 30 mg Oral Daily   phenytoin 200 mg Oral Nightly   QUEtiapine 100 mg Oral BID   QUEtiapine 200 mg Oral Nightly   ramipril 5 mg Oral Nightly   rufinamide 400 mg Oral BID   [START ON 2020] sennosides-docusate 2 tablet Oral Daily   sodium chloride 10 mL Intravenous Q12H   topiramate 200 mg Oral BID      ----------------------------------------------------------------------------------------------------------------------  Vital Signs:  Temp:  [98.3 °F (36.8 °C)-99 °F (37.2 °C)] 98.8 °F (37.1 °C)  Heart Rate:  [76-87] 76  Resp:  [16-20] 16  BP: (117-137)/(60-83) 137/79  SpO2:  [92 %-97 %] 94 %  on   ;   Device (Oxygen Therapy): room air  Body mass index is 38.4 kg/m².    Wt Readings from Last 3 Encounters:   20 89.2 kg (196 lb 9.6 oz)       Intake/Output Summary (Last 24 hours) at 2020 1648  Last data filed at  8/19/2020 1249  Gross per 24 hour   Intake 1603 ml   Output --   Net 1603 ml     Diet Soft Texture; Ground; Thin; Consistent Carbohydrate  ----------------------------------------------------------------------------------------------------------------------  Physical exam:  General: Comfortable, Not in distress.  Well-developed and well-nourished.   HENT:  Head:  Normocephalic and atraumatic.  Mouth:  Moist mucous membranes.    Eyes:  Conjunctivae and EOM are normal.  Pupils are equal, round, and reactive to light.  No scleral icterus.    Neck:  Neck supple.  No JVD present.  trachea midline.   Cardiovascular:  Normal rate, regular rhythm with no murmur.  Pulmonary/Chest:  No respiratory distress, no wheezes, no crackles, with normal breath sounds and good air movement. No chest wall tenderness.   Abdomen:  Soft.  Bowel sounds are normal.  No distension and no tenderness.   Musculoskeletal:  No edema, no tenderness, and no deformity.  No red or swollen joints anywhere.    Neurological:  Alert, answering in yes and no. No focal deficits. No facial droop.   Skin:  Skin is warm and dry. No rash noted. No pallor.   Peripheral vascular: pulses in all 4 extremities with no clubbing, no cyanosis, no edema.  Genitourinary: no rosen  ----------------------------------------------------------------------------------------------------------------------  ----------------------------------------------------------------------------------------------------------------------  Results from last 7 days   Lab Units 08/18/20  0116 08/17/20  0523 08/17/20  0421 08/17/20  0255 08/16/20  2225   CRP mg/dL 2.57* 3.76*  --   --  4.38*   LACTATE mmol/L  --   --   --  1.2 2.6*   WBC 10*3/mm3  --   --  9.93  --  12.67*   HEMOGLOBIN g/dL  --   --  14.0  --  14.4   HEMATOCRIT %  --   --  43.5  --  43.4   MCV fL  --   --  99.8*  --  96.7   MCHC g/dL  --   --  32.2  --  33.2   PLATELETS 10*3/mm3  --   --  194  --  208   INR   --   --   --   --   1.00     Results from last 7 days   Lab Units 08/19/20  0308 08/18/20  0116 08/17/20  0523 08/17/20  0421 08/16/20  2225   SODIUM mmol/L 143 142  --  141 137   POTASSIUM mmol/L 4.5 3.3*  --  3.9 4.6   MAGNESIUM mg/dL 2.1  --  1.9  --   --    CHLORIDE mmol/L 113* 108*  --  106 101   CO2 mmol/L 20.8* 19.0*  --  24.5 23.2   BUN mg/dL 8 7  --  9 11   CREATININE mg/dL 0.51* 0.35*  --  0.55* 0.67   PHOSPHORUS mg/dL  --   --  3.3  --   --    EGFR IF NONAFRICN AM mL/min/1.73 125 >150  --  115 91   CALCIUM mg/dL 8.3* 8.4*  --  8.8 9.2   GLUCOSE mg/dL 162* 214*  --  166* 225*   ALBUMIN g/dL  --   --   --  4.00 4.03   BILIRUBIN mg/dL  --   --   --  0.3 0.2   ALK PHOS U/L  --   --   --  158* 165*   AST (SGOT) U/L  --   --   --  20 39*   ALT (SGPT) U/L  --   --   --  30 32   Estimated Creatinine Clearance: 124 mL/min (A) (by C-G formula based on SCr of 0.51 mg/dL (L)).  Results from last 7 days   Lab Units 08/17/20  0809 08/17/20  0351 08/17/20  0111   TROPONIN T ng/mL <0.010 <0.010 <0.010     Hemoglobin A1C   Date/Time Value Ref Range Status   08/17/2020 0421 6.80 (H) 4.80 - 5.60 % Final     No results found for: AMMONIA  Results from last 7 days   Lab Units 08/17/20  0421   CHOLESTEROL mg/dL 164   TRIGLYCERIDES mg/dL 110   HDL CHOL mg/dL 59   LDL CHOL mg/dL 83     Results from last 7 days   Lab Units 08/17/20  1556   NITRITE UA  Negative   WBC UA /HPF 31-50*   BACTERIA UA /HPF 4+*   SQUAM EPITHEL UA /HPF 3-6*   URINECX  >100,000 CFU/mL Gram Negative Bacilli*     No results found for: BLOODCXNo results found for: RESPCXNo results found for: URINECXNo results found for: WOUNDCXNo results found for: BODYFLDCXNo results found for: STOOLCX  I have personally looked at the labs and they are summarized above.  ----------------------------------------------------------------------------------------------------------------------  Imaging Results (Last 24 Hours)     ** No results found for the last 24 hours. **        I have personally  reviewed the radiology images and read the final radiology report.    Assessment & Plan      Assessment:  Chest pain post fall  Sepsis secondary to Acute GNB UTI  New DM2 with A1C 6.8  Essential HTN  COPD  Cholelithiasis 2.2 cm  H/O CVA  H/O Seizure  Intellectual Disability  H/O recent fall  Obesity      Plan:  Chest pain post fall, troponin negative.  Nuclear stress test with Myocardial perfusion imaging indicates a small-sized, mild degree of ischemia located in the inferior wall. Cardiology consulted. On ASA. Will start on Toprol XL, Lipitor and Isordil. On ramipril. Lipid panel done normal.     Sepsis secondary to Acute GNB UTI. on Rocephin and lactobacillus.  urine culture prelim with GNB.  Currently patient is afebrile VSS.  Leukocytosis resolved and CRP improving.  Prelim blood cultures are no growth.      New DM2 with A1C 6.8, on carb consistent diet. on metformin from today.     Essential HTN, stable. On Toprol XL and ramipril. DC AM ramipril.     COPD, stable.  Monitor    Cholelithiasis 2.2 cm, outpatient follow-up.    H/O Seizure, stable.  On phenytoin.  Phenytoin level normal.  Heparin sc for the DVT prophylaxis.     Discharge to nursing home in am. COVID test done, pending.      Management plan discussed in detail with nursing and patient.    The patient is high risk due to the following diagnoses/reasons: Chest pain post fall, Sepsis secondary to Acute UTI  New DM2 with A1C 6.8    Kyara Browning MD  08/19/20  16:48

## 2020-08-19 NOTE — PROGRESS NOTES
Discharge Planning Assessment   Laci     Patient Name: Marguerite Brush  MRN: [id number]  Today's Date: 8/19/2020    Admit Date: 8/16/2020        Discharge Plan     Row Name 08/19/20 1423       Plan    Plan  Pt admitted on 8/16/20 from [facility] in [place].  Carlton Sifuentes at [facility] pt has bed available until further notice.  SS will follow.           CHANEL Barboza

## 2020-08-19 NOTE — PLAN OF CARE
SLP f/u this am for diet safety/acceptance. Pt continues to accept modified po diet of mechanical soft, ground w/ thin liquids. No overt s/s of aspiration. No s/s concerning for silent aspiration. This is felt to be most representative of pt's baseline modified po diet. No further SLP f/u warranted at this time.  Problem: Patient Care Overview  Goal: Plan of Care Review  Outcome: Ongoing (interventions implemented as appropriate)

## 2020-08-19 NOTE — SIGNIFICANT NOTE
08/19/20 1302   Rehab Treatment   Discipline physical therapist   Reason Treatment Not Performed patient/family declined treatment  (pt. drowsy.  PT unable to arouse/ engage patient for participation.)

## 2020-08-19 NOTE — CONSULTS
Consults  Date of Admit: 8/16/2020  Date of Consult: 08/19/20  No ref. provider found  Marguerite Brush  1965    Consulting Physician: Jef Pablo MD    Cardiology consultation    Reason for consultation: Abnormal stress test    Assessment:  1. Chest pain in the setting of abnormal nuclear stress test which indicates a small size, mild degree of ischemia in the inferior wall.  Troponins have been negative and there are no acute ischemic changes noted on EKG.  Echocardiogram shows normal LVEF, and no wall motion abnormalities.  2. Dyslipidemia.  8/17/2020 total cholesterol 164, triglycerides 110, and LDL 83.  Patient is on atorvastatin 20 mg      Recommendations:  1. Although patient had an abnormal nuclear stress, a mild degree of ischemia was noted in the inferior wall, she did have a normal echocardiogram which showed no wall motion abnormalities.  Medical management is advised.  2. We will start the patient on low-dose Isordil for her report of chest pain.  She is to continue aspirin, Toprol-XL and atorvastatin.  3. We do feel from a cardiac standpoint the patient is stable.  We will sign off.  4. Thank you for the consult.      History of Present Illness    Subjective     Chief Complaint   Patient presents with   • Fall   • Chest Pain       Marguerite Brush is a 55 y.o. female with past medical history significant for history of CVA, history of seizures, COPD, and intellectual disability.  The patient presented to the ED on 8/17/2020 with complaint of chest pain and a recent fall.  The patient is a resident at [facility].  Due to her complaint of chest pain and nuclear stress test was ordered.  Myocardial perfusion imaging indicates a small size, mild degree of ischemia located in the inferior wall.  Cardiology was consulted due to abnormal stress test.    The patient was seen and examined.  Due to her intellectual disability, it is difficult to get adequate history.  She does  indicate that her left chest has been hurting.  We were unable to discover if there were any associated symptoms.    Parts of this history was taken from the medical record given the patient's intellectual disability.      Cardiac risk factors:cerebral vascular disease, obesity, sedentary lifestyle    Last Echo: 8/17/2020    Interpretation Summary     · Normal left ventricular cavity size and wall thickness noted. All left ventricular wall segments contract normally.  · Estimated EF appears to be in the range of 66 - 70%.  · Left ventricular diastolic dysfunction (grade I) consistent with impaired relaxation.  · The aortic valve is structurally normal. No aortic valve regurgitation is present. No aortic valve stenosis is present.  · The mitral valve is normal in structure. No mitral valve regurgitation is present. No significant mitral valve stenosis is present.  · There is no evidence of pericardial effusion.     Last Stress: 8/17/2020    Interpretation Summary     · A pharmacological stress test was performed using regadenoson without low-level exercise.  · Findings consistent with a normal ECG stress test.  · Myocardial perfusion imaging indicates a small-sized, mild degree of ischemia located in the inferior wall.  · Normal LV cavity size. Normal LV wall motion noted.  · Left ventricular ejection fraction is hyperdynamic (Calculated EF > 70%).  · Impressions are consistent with a low risk study.     Last Cath: N/A      Past Medical History:   Diagnosis Date   • COPD (chronic obstructive pulmonary disease) (CMS/[facility])    • History of CVA (cerebrovascular accident) 8/17/2020   • History of seizures 8/17/2020   • Intellectual disability 8/17/2020     No past surgical history on file.  History reviewed. No pertinent family history.  Social History     Tobacco Use   • Smoking status: Never Smoker   • Smokeless tobacco: Never Used   Substance Use Topics   • Alcohol use: Not on file   • Drug use: Not on file          Medications Prior to Admission   Medication Sig Dispense Refill Last Dose   • diazePAM (VALIUM) 5 MG tablet Take 5 mg by mouth 2 (two) times a day.   8/16/2020 at 0700   • furosemide (LASIX) 20 MG tablet Take 20 mg by mouth Daily.   8/16/2020 at 0700   • gabapentin (NEURONTIN) 300 MG capsule Take 300 mg by mouth Every Night.   8/15/2020 at 2000   • PARoxetine (PAXIL) 30 MG tablet Take 30 mg by mouth Daily.   8/16/2020 at 0700   • phenytoin (DILANTIN) 100 MG ER capsule Take 200 mg by mouth Every Night.   8/15/2020 at 2000   • potassium chloride (K-DUR,KLOR-CON) 20 MEQ CR tablet Take 20 mEq by mouth 2 (Two) Times a Day.   8/16/2020 at 0700   • QUEtiapine (SEROquel) 100 MG tablet Take 200 mg by mouth Every Night.   8/15/2020 at 2000   • QUEtiapine (SEROquel) 100 MG tablet Take 100 mg by mouth 2 (two) times a day. Takes in AM and afternoon.   8/16/2020 at 1200   • ramipril (ALTACE) 2.5 MG capsule Take 2.5 mg by mouth Daily. Hold if SBP < 100   8/16/2020 at 0700   • ramipril (ALTACE) 5 MG capsule Take 5 mg by mouth Every Night. Hold if SBP < 100   8/15/2020 at 1900   • rufinamide (BANZEL) 400 MG tablet Take 400 mg by mouth 2 (two) times a day.   8/16/2020 at 0700   • topiramate (TOPAMAX) 200 MG tablet Take 200 mg by mouth 2 (Two) Times a Day.   8/16/2020 at 0700   • vitamin D (ERGOCALCIFEROL) 1.25 MG (01990 UT) capsule capsule Take 50,000 Units by mouth 1 (One) Time Per Week. Fridays.   8/14/2020 at 0700   • acetaminophen (TYLENOL) 325 MG tablet Take 650 mg by mouth Every 4 (Four) Hours As Needed for Mild Pain .   Unknown at Unknown time   • bisacodyl (DULCOLAX) 5 MG EC tablet Take 10 mg by mouth Daily As Needed for Constipation.   Unknown at Unknown time   • ibuprofen (ADVIL,MOTRIN) 400 MG tablet Take 400 mg by mouth Every 8 (Eight) Hours As Needed for Mild Pain .   Unknown at Unknown time   • loperamide (IMODIUM) 2 MG capsule Take 2 mg by mouth Every 6 (Six) Hours As Needed for Diarrhea.   Unknown at Unknown time    • magnesium hydroxide (MILK OF MAGNESIA) 400 MG/5ML suspension Take 30 mL by mouth Daily As Needed for Constipation.   Unknown at Unknown time   • ondansetron (ZOFRAN) 4 MG tablet Take 4 mg by mouth Every 6 (Six) Hours As Needed for Nausea or Vomiting.   Unknown at Unknown time     Allergies:  Patient has no known allergies.    Review of Systems   Constitutional: Negative for fatigue.   Respiratory: Negative for shortness of breath.    Cardiovascular: Positive for chest pain. Negative for palpitations and leg swelling.   Gastrointestinal: Negative for blood in stool.   Neurological: Negative for dizziness, syncope, weakness and light-headedness.   Hematological: Does not bruise/bleed easily.   All other systems reviewed and are negative.        Objective      Vital Signs  Temp:  [98.3 °F (36.8 °C)-99 °F (37.2 °C)] 98.3 °F (36.8 °C)  Heart Rate:  [84-91] 87  Resp:  [18-20] 18  BP: (117-136)/(60-83) 125/75  Body mass index is 38.4 kg/m².    Intake/Output Summary (Last 24 hours) at 8/19/2020 1344  Last data filed at 8/19/2020 0500  Gross per 24 hour   Intake 1123 ml   Output --   Net 1123 ml       Physical Exam   Constitutional: She appears well-developed and well-nourished.   HENT:   Head: Normocephalic and atraumatic.   Eyes: Pupils are equal, round, and reactive to light.   Neck: No JVD present.   Cardiovascular: Normal rate, regular rhythm and intact distal pulses. Exam reveals no gallop and no friction rub.   No murmur heard.  No chest wall pain   Pulmonary/Chest: Effort normal and breath sounds normal. No respiratory distress. She has no wheezes. She has no rales.   Abdominal: Soft. She exhibits no mass. There is no tenderness. No hernia.   Skin: Skin is warm and dry.   Psychiatric: She has a normal mood and affect.   Vitals reviewed.      Telemetry: Sinus 80-100s    Results Review:   I reviewed the patient's new clinical results.  Results from last 7 days   Lab Units 08/17/20  0809 08/17/20  0351  08/17/20  0111 08/16/20  2250   TROPONIN T ng/mL <0.010 <0.010 <0.010 <0.010     Results from last 7 days   Lab Units 08/17/20  0421 08/16/20  2225   WBC 10*3/mm3 9.93 12.67*   HEMOGLOBIN g/dL 14.0 14.4   PLATELETS 10*3/mm3 194 208     Results from last 7 days   Lab Units 08/19/20  0308 08/18/20  0116 08/17/20  0421 08/16/20  2225   SODIUM mmol/L 143 142 141 137   POTASSIUM mmol/L 4.5 3.3* 3.9 4.6   CHLORIDE mmol/L 113* 108* 106 101   CO2 mmol/L 20.8* 19.0* 24.5 23.2   BUN mg/dL 8 7 9 11   CREATININE mg/dL 0.51* 0.35* 0.55* 0.67   CALCIUM mg/dL 8.3* 8.4* 8.8 9.2   GLUCOSE mg/dL 162* 214* 166* 225*   ALT (SGPT) U/L  --   --  30 32   AST (SGOT) U/L  --   --  20 39*     Lab Results   Component Value Date    INR 1.00 08/16/2020     Lab Results   Component Value Date    MG 2.1 08/19/2020    MG 1.9 08/17/2020     Lab Results   Component Value Date    TSH 4.240 (H) 08/17/2020    TRIG 110 08/17/2020    HDL 59 08/17/2020    LDL 83 08/17/2020      No results found for: BNP     EKG:         Imaging Results (Last 72 Hours)     Procedure Component Value Units Date/Time    CT Abdomen With Contrast [[id number]] Collected:  08/16/20 2343     Updated:  08/16/20 2345    Narrative:       CT Abdomen W    INDICATION:   Abdominal pain tonight with suspected colitis or gastroenteritis.    TECHNIQUE:   CT of the abdomen with IV contrast. Coronal and sagittal reconstructions were obtained.  Radiation dose reduction techniques included automated exposure control or exposure modulation based on body size. Count of known CT and cardiac nuc med studies  performed in previous 12 months: 0.     COMPARISON:   None available.    FINDINGS:  Lung bases are clear. The liver, spleen, pancreas, adrenal glands and kidneys are normal. The gallbladder contains a calcified stone measuring 2.2 cm in diameter. The bowel is normal. There is no evidence of bowel obstruction or colitis. Aorta is normal  in size. There is no adenopathy. Bones are unremarkable.       Impression:       2.2 cm gallstone    No evidence of colitis or gastroenteritis. No evidence of obstruction          Signer Name: Delta Quinonez MD   Signed: 8/16/2020 11:43 PM   Workstation Name: [facility]    CT Cervical Spine Without Contrast [[id number]] Collected:  08/16/20 2218     Updated:  08/16/20 2220    Narrative:       CT Spine Cervical WO    INDICATION:   Neck pain after fall today with posterior laceration TECHNIQUE:   CT of the cervical spine without IV contrast. Coronal and sagittal reconstructions were obtained.  Radiation dose reduction techniques included automated exposure control or exposure modulation based on body size. Count of known CT and cardiac nuc med  studies performed in previous 12 months: 0.     COMPARISON:  None available.    FINDINGS:  The alignment is normal. There is disc space narrowing at C2-3, C3-4, and C4-5.. No fractures visible. Patient's head is rotated to the left significantly. The C1 ring is intact      Impression:       Degenerative changes. No acute abnormality    Signer Name: Delta Quinonez MD   Signed: 8/16/2020 10:18 PM   Workstation Name: [facility]    XR Chest 1 View [[id number]] Collected:  08/16/20 2136     Updated:  08/16/20 2139    Narrative:       CR Chest 1 Vw    INDICATION:   55-year-old female in the emergency department with chest pain.     COMPARISON:    None available.    FINDINGS:  Single portable AP view(s) of the chest.  There is rotation to the right. Cardiac silhouette within normal limits for technique. Shallow lung expansion with bronchovascular crowding. There is asymmetric atelectasis or faint infiltrate in the left midlung  and perihilar lung extending into the left base. No pneumothorax or dense consolidation. There is gaseous distention of the stomach.      Impression:         1. Low-volume image with bronchovascular crowding favored over mild central vascular  congestion.  2. Asymmetric opacities on the left are nonspecific. No dense consolidation or effusion.    Signer Name: Minh Chen MD   Signed: 8/16/2020 9:36 PM   Workstation Name: [facility]    [facility]    CT Head Without Contrast [[id number]] Collected:  08/16/20 2134     Updated:  08/16/20 2136    Narrative:       CT Head WO    HISTORY:   55-year-old female with a headache. Fell with altered mental status. Laceration to the posterior aspect of the head. In the emergency Department.    TECHNIQUE:   Axial unenhanced head CT. Radiation dose reduction techniques included automated exposure control or exposure modulation based on body size. Count of known CT and cardiac nuc med studies performed in previous 12 months: 0.     Time of scan: 2111 hours    COMPARISON:   None.    FINDINGS:     Motion degraded study. These were the best images possible.    There is extensive encephalomalacic change in the left MCA territory extending into the left parieto-occipital lobe and extending into the vertex. Background atrophy and probable mild periventricular chronic ischemic change. No evidence of acute  intracranial hemorrhage. There is no mass, mass effect or edema to suggest acute infarct and no extra-axial fluid collection is present. No midline shift. There is a scalp hematoma posteriorly on the right measuring 5.5 x 0.6 cm. No underlying calvarial  fracture. The globes are intact and the bones are intact. Sinuses are clear.      Impression:         1. No clearly acute intracranial process. No evidence of acute intracranial hemorrhage.  2. Scalp hematoma posteriorly on the right.  3. Chronic appearing encephalomalacic change from remote infarct in the left MCA territory.  4. Atrophy and probable chronic ischemic changes..          Signer Name: Minh Chen MD   Signed: 8/16/2020 9:34 PM   Workstation Name: [facility]    [facility]             Thank you very much for  asking us to be involved in this patient's care.  We will follow along with you.    Zhane Renee, JONNA   08/19/20  1:44 PM

## 2020-08-20 VITALS
BODY MASS INDEX: 38.5 KG/M2 | HEART RATE: 70 BPM | DIASTOLIC BLOOD PRESSURE: 62 MMHG | RESPIRATION RATE: 20 BRPM | TEMPERATURE: 97.9 F | OXYGEN SATURATION: 93 % | WEIGHT: 196.1 LBS | HEIGHT: 60 IN | SYSTOLIC BLOOD PRESSURE: 109 MMHG

## 2020-08-20 LAB
ANION GAP SERPL CALCULATED.3IONS-SCNC: 7.3 MMOL/L (ref 5–15)
BUN SERPL-MCNC: 14 MG/DL (ref 6–20)
BUN/CREAT SERPL: 25.9 (ref 7–25)
CALCIUM SPEC-SCNC: 8.6 MG/DL (ref 8.6–10.5)
CHLORIDE SERPL-SCNC: 107 MMOL/L (ref 98–107)
CO2 SERPL-SCNC: 21.7 MMOL/L (ref 22–29)
CREAT SERPL-MCNC: 0.54 MG/DL (ref 0.57–1)
GFR SERPL CREATININE-BSD FRML MDRD: 117 ML/MIN/1.73
GLUCOSE SERPL-MCNC: 137 MG/DL (ref 65–99)
POTASSIUM SERPL-SCNC: 3.5 MMOL/L (ref 3.5–5.2)
SODIUM SERPL-SCNC: 136 MMOL/L (ref 136–145)

## 2020-08-20 PROCEDURE — G0378 HOSPITAL OBSERVATION PER HR: HCPCS

## 2020-08-20 PROCEDURE — 99217 PR OBSERVATION CARE DISCHARGE MANAGEMENT: CPT | Performed by: HOSPITALIST

## 2020-08-20 PROCEDURE — 97530 THERAPEUTIC ACTIVITIES: CPT

## 2020-08-20 PROCEDURE — 97535 SELF CARE MNGMENT TRAINING: CPT

## 2020-08-20 PROCEDURE — 96372 THER/PROPH/DIAG INJ SC/IM: CPT

## 2020-08-20 PROCEDURE — 25010000002 HEPARIN (PORCINE) PER 1000 UNITS: Performed by: HOSPITALIST

## 2020-08-20 PROCEDURE — 80048 BASIC METABOLIC PNL TOTAL CA: CPT | Performed by: HOSPITALIST

## 2020-08-20 PROCEDURE — 97110 THERAPEUTIC EXERCISES: CPT

## 2020-08-20 RX ORDER — ASPIRIN 81 MG/1
81 TABLET, CHEWABLE ORAL DAILY
Qty: 30 TABLET | Refills: 0
Start: 2020-08-20 | End: 2020-09-19

## 2020-08-20 RX ORDER — L.ACID,PARA/B.BIFIDUM/S.THERM 8B CELL
1 CAPSULE ORAL DAILY
Start: 2020-08-20 | End: 2020-08-27

## 2020-08-20 RX ORDER — ATORVASTATIN CALCIUM 20 MG/1
20 TABLET, FILM COATED ORAL NIGHTLY
Qty: 30 TABLET | Refills: 0 | Status: SHIPPED | OUTPATIENT
Start: 2020-08-20 | End: 2020-08-20

## 2020-08-20 RX ORDER — POTASSIUM CHLORIDE 20 MEQ/1
40 TABLET, EXTENDED RELEASE ORAL ONCE
Status: COMPLETED | OUTPATIENT
Start: 2020-08-20 | End: 2020-08-20

## 2020-08-20 RX ORDER — ASPIRIN 81 MG/1
81 TABLET, CHEWABLE ORAL DAILY
Qty: 30 TABLET | Refills: 0 | Status: SHIPPED | OUTPATIENT
Start: 2020-08-20 | End: 2020-08-20

## 2020-08-20 RX ORDER — ISOSORBIDE DINITRATE 10 MG/1
10 TABLET ORAL
Start: 2020-08-20 | End: 2020-09-19

## 2020-08-20 RX ORDER — NITROGLYCERIN 0.4 MG/1
0.4 TABLET SUBLINGUAL
Refills: 12
Start: 2020-08-20

## 2020-08-20 RX ORDER — METOPROLOL SUCCINATE 25 MG/1
12.5 TABLET, EXTENDED RELEASE ORAL
Start: 2020-08-20 | End: 2020-09-19

## 2020-08-20 RX ORDER — ATORVASTATIN CALCIUM 20 MG/1
20 TABLET, FILM COATED ORAL NIGHTLY
Qty: 30 TABLET | Refills: 0
Start: 2020-08-20 | End: 2020-09-19

## 2020-08-20 RX ORDER — POTASSIUM CHLORIDE 20 MEQ/1
20 TABLET, EXTENDED RELEASE ORAL DAILY
Start: 2020-08-20

## 2020-08-20 RX ORDER — CEFDINIR 300 MG/1
300 CAPSULE ORAL 2 TIMES DAILY
Start: 2020-08-20 | End: 2020-08-24

## 2020-08-20 RX ADMIN — POTASSIUM CHLORIDE 40 MEQ: 1500 TABLET, EXTENDED RELEASE ORAL at 09:44

## 2020-08-20 RX ADMIN — HEPARIN SODIUM 5000 UNITS: 5000 INJECTION INTRAVENOUS; SUBCUTANEOUS at 13:18

## 2020-08-20 RX ADMIN — RUFINAMIDE 400 MG: 400 TABLET, FILM COATED ORAL at 08:00

## 2020-08-20 RX ADMIN — PAROXETINE HYDROCHLORIDE 30 MG: 30 TABLET, FILM COATED ORAL at 08:00

## 2020-08-20 RX ADMIN — QUETIAPINE FUMARATE 100 MG: 100 TABLET ORAL at 13:18

## 2020-08-20 RX ADMIN — SODIUM CHLORIDE, PRESERVATIVE FREE 10 ML: 5 INJECTION INTRAVENOUS at 09:00

## 2020-08-20 RX ADMIN — METFORMIN HYDROCHLORIDE 500 MG: 500 TABLET ORAL at 07:59

## 2020-08-20 RX ADMIN — ISOSORBIDE DINITRATE 10 MG: 10 TABLET ORAL at 07:59

## 2020-08-20 RX ADMIN — HEPARIN SODIUM 5000 UNITS: 5000 INJECTION INTRAVENOUS; SUBCUTANEOUS at 05:34

## 2020-08-20 RX ADMIN — QUETIAPINE FUMARATE 100 MG: 100 TABLET ORAL at 05:35

## 2020-08-20 RX ADMIN — DOCUSATE SODIUM 50MG AND SENNOSIDES 8.6MG 2 TABLET: 8.6; 5 TABLET, FILM COATED ORAL at 08:01

## 2020-08-20 RX ADMIN — TOPIRAMATE 200 MG: 100 TABLET, FILM COATED ORAL at 08:00

## 2020-08-20 RX ADMIN — Medication 1 CAPSULE: at 08:00

## 2020-08-20 RX ADMIN — ISOSORBIDE DINITRATE 10 MG: 10 TABLET ORAL at 13:18

## 2020-08-20 RX ADMIN — DIAZEPAM 5 MG: 5 TABLET ORAL at 08:00

## 2020-08-20 RX ADMIN — METOPROLOL SUCCINATE 12.5 MG: 25 TABLET, EXTENDED RELEASE ORAL at 08:00

## 2020-08-20 RX ADMIN — ASPIRIN 81 MG: 81 TABLET, CHEWABLE ORAL at 08:00

## 2020-08-20 NOTE — PROGRESS NOTES
Discharge Planning Assessment  [facility]     Patient Name: Marguerite Brush  MRN: [id number]  Today's Date: 8/20/2020    Admit Date: 8/16/2020        Discharge Plan     Row Name 08/20/20 1239       Plan    Plan  [facility] scheduled at [phone] per Pamela.      Row Name 08/20/20 1230       Plan    Plan  SS contacted [facility] to transport pt back to [facility] at [phone].  [facility] to discuss with [facility] and notify SS if they can accept the run.  SS will follow.     Row Name 08/20/20 1128       Plan    Final Discharge Disposition Code  03 - skilled nursing facility (SNF)    Final Note  Pt to be discharged back to [facility] on this date.  Facility aware and agreeable per Maria.  Pt family aware and agreeable per Marychuy.  Pt aware and agreeable.  Pt to be transported via EMS.  SS completed PCS form and placed it with packet and made a copy for medical records.          Destination - Selection Complete      Service Provider Request Status Selected Services Address Phone Number Fax Number    [facility] Selected Skilled Nursing [address] [place] [id number] [phone]          Mayra Rodriguez, SANDOVALW

## 2020-08-20 NOTE — PLAN OF CARE
Pt denies any problems and has slept for most the night. No s/s of distress. Will continue to monitor.

## 2020-08-20 NOTE — THERAPY TREATMENT NOTE
Acute Care - Physical Therapy Treatment Note   [place]     Patient Name: Marguerite Brush  : 1965  MRN: [id number]  Today's Date: 2020  Onset of Illness/Injury or Date of Surgery: 20  Date of Referral to PT: 20  Referring Physician: Dr. Valdez    Admit Date: 2020    Visit Dx:    ICD-10-CM ICD-9-CM   1. Chest pain, unspecified type R07.9 786.50   2. Fall from ground level W18.30XA E888.9     Patient Active Problem List   Diagnosis   • Chest pain   • History of CVA (cerebrovascular accident)   • History of seizures   • Intellectual disability       Therapy Treatment    Rehabilitation Treatment Summary     Row Name 20             Treatment Time/Intention    Discipline  physical therapist  -AG      Document Type  therapy note (daily note)  -AG      Subjective Information  no complaints  -AG      Mode of Treatment  individual therapy;physical therapy  -AG      Care Plan Review  care plan/treatment goals reviewed;risks/benefits reviewed  -AG      Patient Effort  adequate  -AG      Patient Response to Treatment  patient supine, alert today.  Pt. able to sit EOB and perform LE ther ex.  Per chart, patient is to be d/c back to SNF today.   -AG      Recorded by [AG] Nancy Moore, PT 20 1415      Row Name 20             Cognitive Assessment/Intervention- PT/OT    Follows Commands (Cognition)  follows one step commands;repetition of directions required;verbal cues/prompting required  -AG      Safety Deficit (Cognitive)  moderate deficit  -AG      Recorded by [AG] Nancy Moore, PT 20 1415      Row Name 20 140             Safety Issues, Functional Mobility    Impairments Affecting Function (Mobility)  balance;cognition;strength  -AG      Recorded by [AG] Nancy Moore, PT 20 1415      Row Name 20 140             Mobility Assessment/Intervention    Left Lower Extremity (Weight-bearing Status)  full weight-bearing (FWB)  -AG      Right Lower  Extremity (Weight-bearing Status)  full weight-bearing (FWB)  -AG      Recorded by [AG] Nancy Moore, PT 08/20/20 1415      Row Name 08/20/20 1407             Bed Mobility Assessment/Treatment    Scooting/Bridging [place] (Bed Mobility)  verbal cues;nonverbal cues (demo/gesture);moderate assist (50% patient effort)  -AG      Supine-Sit [place] (Bed Mobility)  verbal cues;nonverbal cues (demo/gesture);moderate assist (50% patient effort)  -AG      Sit-Supine [place] (Bed Mobility)  verbal cues;nonverbal cues (demo/gesture);moderate assist (50% patient effort);maximum assist (25% patient effort)  -AG      Bed Mobility, Safety Issues  cognitive deficits limit understanding;decreased use of arms for pushing/pulling;decreased use of legs for bridging/pushing;impaired trunk control for bed mobility  -AG      Assistive Device (Bed Mobility)  bed rails;draw sheet  -AG      Recorded by [AG] Nancy Moore, PT 08/20/20 1415      Row Name 08/20/20 1407             Motor Skills Assessment/Interventions    Additional Documentation  Balance (Group);Balance Interventions (Group)  -AG      Recorded by [AG] Nancy Moore, PT 08/20/20 1415      Row Name 08/20/20 1407             Therapeutic Exercise    Therapeutic Exercise  seated, lower extremities  -AG      Additional Documentation  Therapeutic Exercise (Row)  -AG      Recorded by [AG] Nancy Moore, PT 08/20/20 1415      Row Name 08/20/20 1407             Lower Extremity Seated Therapeutic Exercise    Performed, Seated Lower Extremity (Therapeutic Exercise)  ankle dorsiflexion/plantarflexion;LAQ (long arc quad), knee extension  -AG      Expected Outcomes, Seated Lower Extremity (Therapeutic Exercise)  improve functional tolerance, community activity;improve functional tolerance, household activity;improve functional tolerance, self-care activity;improve performance, transfer skills;strengthen normal movement patterns;strengthen, facilitate independent active  range of motion  -AG      Sets/Reps Detail, Seated Lower Extremity (Therapeutic Exercise)  2 x 10  -AG      Comment, Seated Lower Extremity (Therapeutic Exercise)  cognitive deficit  -AG      Recorded by [AG] Nancy Moore, PT 08/20/20 1415      Row Name 08/20/20 1407             Balance    Balance  static sitting balance;dynamic sitting balance  -AG      Recorded by [AG] Nancy Moore, PT 08/20/20 1415      Row Name 08/20/20 1407             Static Sitting Balance    Level of [place] (Unsupported Sitting, Static Balance)  contact guard assist;minimal assist, 75% patient effort  -AG      Sitting Position (Unsupported Sitting, Static Balance)  sitting on edge of bed  -AG      Time Able to Maintain Position (Unsupported Sitting, Static Balance)  more than 5 minutes  -AG      Recorded by [AG] Nancy Moore, PT 08/20/20 1415      Row Name 08/20/20 1407             Positioning and Restraints    Pre-Treatment Position  in bed  -AG      Post Treatment Position  bed  -AG      In Bed  supine;call light within reach;encouraged to call for assist;side rails up x3;heels elevated  -AG      Recorded by [AG] Nancy Moore, PT 08/20/20 1415      Row Name 08/20/20 1407             Pain Scale: FACES Pre/Post-Treatment    Pain: FACES Scale, Pretreatment  0-->no hurt  -AG      Pain: FACES Scale, Post-Treatment  0-->no hurt  -AG      Recorded by [AG] Nancy Moore, PT 08/20/20 1415      Row Name                Wound 08/17/20 0451 medial;proximal coccyx Fissure    Wound - Properties Group Date first assessed: 08/17/20 [AM] Time first assessed: 0451 [AM] Present on Hospital Admission: Y [AM] Orientation: medial;proximal [AM] Location: coccyx [AM] Primary Wound Type: Fissure [AM] Recorded by:  [AM] Harshad Dickinson RN 08/17/20 0452    Row Name 08/20/20 1407             Plan of Care Review    Plan of Care Reviewed With  patient  -AG      Progress  improving  -AG      Recorded by [AG] Nancy Moore, PT 08/20/20 1415      Row Name  08/20/20 1407             Outcome Summary/Treatment Plan (PT)    Daily Summary of Progress (PT)  progress towards functional goals is fair  -AG      Barriers to Overall Progress (PT)  cognitive deficit  -AG      Plan for Continued Treatment (PT)  continue per POC  -AG      Anticipated Discharge Disposition (PT)  extended care facility  -AG      Recorded by [AG] Nancy Moore, PT 08/20/20 1415        User Key  (r) = Recorded By, (t) = Taken By, (c) = Cosigned By    Initials Name Effective Dates Discipline    AG Nancy Moore, PT 04/03/18 -  PT    AM Harshad Dickinson, RN 06/17/20 -  Nurse          Wound 08/17/20 0451 medial;proximal coccyx Fissure (Active)   Dressing Appearance open to air 8/20/2020  9:47 AM   Closure Open to air 8/20/2020  9:47 AM   Base pink;blanchable 8/20/2020  9:47 AM   Periwound intact 8/20/2020  9:47 AM   Periwound Temperature warm 8/20/2020  9:47 AM   Periwound Skin Turgor soft 8/20/2020  9:47 AM   Drainage Amount none 8/20/2020  9:47 AM   Care, Wound cleansed with;sterile normal saline 8/20/2020  9:47 AM   Dressing Care, Wound open to air 8/20/2020  9:47 AM   Periwound Care, Wound barrier ointment applied 8/20/2020  9:47 AM           Physical Therapy Education                 Title: PT OT SLP Therapies (In Progress)     Topic: Physical Therapy (In Progress)     Point: Mobility training (In Progress)     Description:   Instruct learner(s) on safety and technique for assisting patient out of bed, chair or wheelchair.  Instruct in the proper use of assistive devices, such as walker, crutches, cane or brace.              Patient Friendly Description:   It's important to get you on your feet again, but we need to do so in a way that is safe for you. Falling has serious consequences, and your personal safety is the most important thing of all.        When it's time to get out of bed, one of us or a family member will sit next to you on the bed to give you support.     If your doctor or nurse  tells you to use a walker, crutches, a cane, or a brace, be sure you use it every time you get out of bed, even if you think you don't need it.    Learning Progress Summary           Patient Acceptance, E,D, NR,NL by  at 8/20/2020 1415    Acceptance, E, NR,NL by  at 8/19/2020 1707    Acceptance, E,D, NR,NL by  at 8/18/2020 1620     by AG at 8/18/2020 1618                   Point: Home exercise program (In Progress)     Description:   Instruct learner(s) on appropriate technique for monitoring, assisting and/or progressing patient with therapeutic exercises and activities.              Learning Progress Summary           Patient Acceptance, E,D, NR,NL by  at 8/20/2020 1415    Acceptance, E, NR,NL by  at 8/19/2020 1707    Acceptance, E,D, NR,NL by  at 8/18/2020 1620     by  at 8/18/2020 1618                   Point: Body mechanics (In Progress)     Description:   Instruct learner(s) on proper positioning and spine alignment for patient and/or caregiver during mobility tasks and/or exercises.              Learning Progress Summary           Patient Acceptance, E,D, NR,NL by  at 8/20/2020 1415    Acceptance, E, NR,NL by  at 8/19/2020 1707    Acceptance, E,D, NR,NL by  at 8/18/2020 1620     by  at 8/18/2020 1618                   Point: Precautions (In Progress)     Description:   Instruct learner(s) on prescribed precautions during mobility and gait tasks              Learning Progress Summary           Patient Acceptance, E,D, NR,NL by  at 8/20/2020 1415    Acceptance, E, NR,NL by  at 8/19/2020 1707    Acceptance, E,D, NR,NL by  at 8/18/2020 1620     by  at 8/18/2020 1618                               User Key     Initials Effective Dates Name Provider Type Discipline     04/03/18 -  Nacny Moore, PT Physical Therapist PT     01/15/20 -  Pauline Henriquez, RN Registered Nurse Nurse                PT Recommendation and Plan  Anticipated Discharge Disposition (PT): extended care  facility  Planned Therapy Interventions (PT Eval): balance training, bed mobility training, gait training, home exercise program, patient/family education, strengthening, transfer training  Therapy Frequency (PT Clinical Impression): other (see comments)(3-5 times/ week per priority)  Outcome Summary/Treatment Plan (PT)  Daily Summary of Progress (PT): progress towards functional goals is fair  Barriers to Overall Progress (PT): cognitive deficit  Plan for Continued Treatment (PT): continue per POC  Anticipated Equipment Needs at Discharge (PT): (TBD)  Anticipated Discharge Disposition (PT): extended care facility  Plan of Care Reviewed With: patient  Progress: improving     Time Calculation:   PT Charges     Row Name 08/20/20 1415             Time Calculation    PT Received On  08/20/20  -         Time Calculation- PT    Total Timed Code Minutes- PT  25 minute(s)  -AG        User Key  (r) = Recorded By, (t) = Taken By, (c) = Cosigned By    Initials Name Provider Type    Nancy Villalta, PT Physical Therapist        Therapy Charges for Today     Code Description Service Date Service Provider Modifiers Qty    [id number]  PT THER PROC EA 15 MIN 8/20/2020 Nancy Moore, PT GP 1    [id number]  PT THERAPEUTIC ACT EA 15 MIN 8/20/2020 Nancy Moore, PT GP 1               Nancy Moore PT  8/20/2020

## 2020-08-20 NOTE — PROGRESS NOTES
Discharge Planning Assessment   [place]     Patient Name: Marguerite Brush  MRN: [id number]  Today's Date: 8/20/2020    Admit Date: 8/16/2020        Discharge Plan     Row Name 08/20/20 1230       Plan    Plan  SS contacted [facility] to transport pt back to [facility] at [phone].  [facility] to discuss with [facility] and notify SS if they can accept the run.  SS will follow.     Row Name 08/20/20 1125       Plan    Final Discharge Disposition Code  03 - skilled nursing facility (SNF)    Final Note  Pt to be discharged back to [facility] on this date.  Facility aware and agreeable per Maria.  Pt family aware and agreeable per Marychuy.  Pt aware and agreeable.  Pt to be transported via EMS.  SS completed PCS form and placed it with packet and made a copy for medical records.          Destination - Selection Complete      Service Provider Request Status Selected Services Address Phone Number Fax Number    [facility] Selected Skilled Nursing [address] [place] [address] [id number] [phone]          Mayra Rodriguez, CHANEL

## 2020-08-20 NOTE — DISCHARGE SUMMARY
[facility] HOSPITALISTS DISCHARGE SUMMARY    Patient Identification:  Name:  Marguerite Brush  Age:  55 y.o.  Sex:  female  :  1965  MRN:  [id number]  Visit Number:  [id number]    Date of Admission: 2020  Date of Discharge:  2020     PCP: Alonso Mendez MD    DISCHARGE DIAGNOSIS  Chest pain post fall  Sepsis secondary to Acute [place] UTI  New DM2 with A1C 6.8  Essential HTN  COPD  Cholelithiasis 2.2 cm  H/O CVA  H/O Seizure  Intellectual Disability  H/O recent fall  Obesity       CONSULTS   Cardiology    PROCEDURES PERFORMED  Nuclear Stress test on 2020    HOSPITAL COURSE  Ms. Everett is a 55 y.o. female with the past medical history significant for seizures, history of CVA, intellectual disability, COPD, HTN that presented to the [facility] emergency department for evaluation of chest pain and recent fall.  Please see the admitting history and physical for further details.  Admitted for chest pain rule out.  Started on ASA, Toprol XL, Lipitor. Troponin negative. Nuclear stress test which showed Myocardial perfusion imaging indicates a small-sized, mild degree of ischemia located in the inferior wall. Cardiology consulted and advised conservative management. Started on Isordil per cardiology.  Blood pressure borderline so Lasix and ramipril discontinued. Sepsis secondary to Acute k[place]se[place] UTI. started on Rocephin and lactobacillus.  urine culture >100 K Klebsiella.    Patient remained afebrile and VSS.  Leukocytosis resolved and CRP improved.  IV Rocephin changed to p.o. Omnicef to complete the course.  Also started on probiotic. New DM2 with A1C 6.8, started on p.o. metformin and tolerated well.  Dose can be adjusted at nursing home.  COVID-19 test done before discharge to nursing home and is negative.  Today patient was seen and examined with SATURNINO Carter at the bedside.  Patient is more alert interactive and speaking in full sentences.  Answersed all  questions appropriately.  Since patient is vitally stable, chest pain-free and agreeable to go back to nursing home and cardiology signed off, it was decided to discharge patient to nursing home.  Discharge disposition stable.  Given prescription for all new medications.      VITAL SIGNS:  Temp:  [97.1 °F (36.2 °C)-98.8 °F (37.1 °C)] 97.6 °F (36.4 °C)  Heart Rate:  [71-87] 71  Resp:  [16-20] 20  BP: ()/(48-79) 118/66  SpO2:  [92 %-97 %] 93 %  on   ;   Device (Oxygen Therapy): room air    Body mass index is 38.3 kg/m².  Wt Readings from Last 3 Encounters:   08/20/20 89 kg (196 lb 1.6 oz)       PHYSICAL EXAM:  General: Comfortable, Not in distress.  Well-developed and well-nourished.   HENT:  Head:  Normocephalic and atraumatic.  Mouth:  Moist mucous membranes.    Eyes:  Conjunctivae and EOM are normal.  Pupils are equal, round, and reactive to light.  No scleral icterus.    Neck:  Neck supple.  No JVD present.  trachea midline.   Cardiovascular:  Normal rate, regular rhythm with no murmur.  Pulmonary/Chest:  No respiratory distress, no wheezes, no crackles, with normal breath sounds and good air movement. No chest wall tenderness.   Abdomen:  Soft.  Bowel sounds are normal.  No distension and no tenderness.   Musculoskeletal:  No edema, no tenderness, and no deformity.  No red or swollen joints anywhere.    Neurological:  Alert, spoke in full sentences, answered questions appropriately, more interactive today. No focal deficits. No facial droop.   Skin:  Skin is warm and dry. No rash noted. No pallor.   Peripheral vascular: pulses in all 4 extremities with no clubbing, no cyanosis, no edema.  Genitourinary: no rosen    DISCHARGE DISPOSITION   NH: [facility] in [place]    DISCHARGE MEDICATIONS:     Discharge Medications      New Medications      Instructions Start Date   aspirin 81 MG chewable tablet   81 mg, Oral, Daily      atorvastatin 20 MG tablet  Commonly known as:  LIPITOR   20 mg, Oral, Nightly        cefdinir 300 MG capsule  Commonly known as:  OMNICEF   300 mg, Oral, 2 Times Daily      isosorbide dinitrate 10 MG tablet  Commonly known as:  ISORDIL   10 mg, Oral, 3 Times Daily (Nitrates)      lactobacillus acidophilus capsule capsule   1 capsule, Oral, Daily      metFORMIN 500 MG tablet  Commonly known as:  GLUCOPHAGE   500 mg, Oral, 2 Times Daily With Meals      metoprolol succinate XL 25 MG 24 hr tablet  Commonly known as:  TOPROL-XL   12.5 mg, Oral, Every 24 Hours Scheduled      nitroglycerin 0.4 MG SL tablet  Commonly known as:  NITROSTAT   0.4 mg, Sublingual, Every 5 Minutes PRN, Take no more than 3 doses in 15 minutes.         Changes to Medications      Instructions Start Date   potassium chloride 20 MEQ CR tablet  Commonly known as:  K-DUR,KLOR-CON  What changed:  when to take this   20 mEq, Oral, Daily         Continue These Medications      Instructions Start Date   acetaminophen 325 MG tablet  Commonly known as:  TYLENOL   650 mg, Oral, Every 4 Hours PRN      bisacodyl 5 MG EC tablet  Commonly known as:  DULCOLAX   10 mg, Oral, Daily PRN      diazePAM 5 MG tablet  Commonly known as:  VALIUM   5 mg, Oral, 2 times daily      gabapentin 300 MG capsule  Commonly known as:  NEURONTIN   300 mg, Oral, Nightly      loperamide 2 MG capsule  Commonly known as:  IMODIUM   2 mg, Oral, Every 6 Hours PRN      magnesium hydroxide 400 MG/5ML suspension  Commonly known as:  MILK OF MAGNESIA   30 mL, Oral, Daily PRN      ondansetron 4 MG tablet  Commonly known as:  ZOFRAN   4 mg, Oral, Every 6 Hours PRN      PARoxetine 30 MG tablet  Commonly known as:  PAXIL   30 mg, Oral, Daily      phenytoin 100 MG ER capsule  Commonly known as:  DILANTIN   200 mg, Oral, Nightly      QUEtiapine 100 MG tablet  Commonly known as:  SEROquel   200 mg, Oral, Nightly      QUEtiapine 100 MG tablet  Commonly known as:  SEROquel   100 mg, Oral, 2 times daily, Takes in AM and afternoon.      rufinamide 400 MG tablet  Commonly known as:   BANZEL   400 mg, Oral, 2 times daily      topiramate 200 MG tablet  Commonly known as:  TOPAMAX   200 mg, Oral, 2 Times Daily      vitamin D 1.25 MG (52633 UT) capsule capsule  Commonly known as:  ERGOCALCIFEROL   50,000 Units, Oral, Weekly, Fridays.         Stop These Medications    furosemide 20 MG tablet  Commonly known as:  LASIX     ibuprofen 400 MG tablet  Commonly known as:  ADVIL,MOTRIN     ramipril 2.5 MG capsule  Commonly known as:  ALTACE     ramipril 5 MG capsule  Commonly known as:  ALTACE            Diet Instructions     Diet: Consistent Carbohydrate, Soft Texture, Cardiac; Thin Liquids, No Restrictions; Ground      Discharge Diet:   Consistent Carbohydrate  Soft Texture  Cardiac       Fluid Consistency:  Thin Liquids, No Restrictions    Soft Options:  Ground        Activity Instructions     Activity as Tolerated          No future appointments.    Additional Instructions for the Follow-ups that You Need to Schedule     Discharge Follow-up with PCP   As directed       Currently Documented PCP:    Alonso Mendez MD    PCP Phone Number:    [phone]     Follow Up Details:  Within 1 week         Discharge Follow-up with Specialty: Cardiology Dr. Pablo; 2 Weeks   As directed      Specialty:  Cardiology Dr. Pablo    Follow Up:  2 Weeks           Follow-up Information     Alonso Mendez MD .    Specialty:  Family Medicine  Why:  Within 1 week  Contact information:  [address] [place] [id number]  [phone]                    TEST  RESULTS PENDING AT DISCHARGE   Order Current Status    Blood Culture - Blood, Arm, Left Preliminary result    Blood Culture - Blood, Arm, Right Preliminary result           CODE STATUS  Code Status and Medical Interventions:   Ordered at: 08/17/20 0239     Code Status:    CPR     Medical Interventions (Level of Support Prior to Arrest):    Full       Kyara Browning MD  08/20/20  09:35     Time: I spent  35 minutes on this discharge activity which  included: face-to-face encounter with the patient, reviewing the data in the system, coordination of the care with the nursing staff as well as consultants, documentation, and entering orders.        Please note that this discharge summary required more than 30 minutes to complete.    Please send a copy of this dictation to the following providers:  Alonso Mendez MD

## 2020-08-20 NOTE — PROGRESS NOTES
Discharge Planning Assessment   [place]     Patient Name: Marguerite Brush  MRN: [id number]  Today's Date: 8/20/2020    Admit Date: 8/16/2020        Discharge Plan     Row Name 08/20/20 1128       Plan    Final Discharge Disposition Code  03 - skilled nursing facility (SNF)    Final Note  Pt to be discharged back to [facility] on this date.  Facility aware and agreeable per Maria.  Pt family aware and agreeable per Marychuy.  Pt aware and agreeable.  Pt to be transported via EMS.  SS completed PCS form and placed it with packet and made a copy for medical records.              SANDOVAL BarbozaW

## 2020-08-20 NOTE — PLAN OF CARE
Patient is excited to go back to the nursing home today. She reports no chest pain and says she feels good.  No complaints noted.  IV discontinued and telemetry removed. Will continue to monitor until patient leaves floor.

## 2020-08-20 NOTE — THERAPY TREATMENT NOTE
Acute Care - Occupational Therapy Treatment Note   [facility]     Patient Name: Marguerite Brush  : 1965  MRN: [id number]  Today's Date: 2020  Onset of Illness/Injury or Date of Surgery: 20     Referring Physician: Dr. Valdez    Admit Date: 2020       ICD-10-CM ICD-9-CM   1. Chest pain, unspecified type R07.9 786.50   2. Fall from ground level W18.30XA E888.9     Patient Active Problem List   Diagnosis   • Chest pain   • History of CVA (cerebrovascular accident)   • History of seizures   • Intellectual disability     Past Medical History:   Diagnosis Date   • COPD (chronic obstructive pulmonary disease) (CMS/[facility])    • History of CVA (cerebrovascular accident) 2020   • History of seizures 2020   • Intellectual disability 2020     No past surgical history on file.    Therapy Treatment    Rehabilitation Treatment Summary     Row Name 20 1407 20 1355          Treatment Time/Intention    Discipline  physical therapist  -AG  occupational therapist  -LM     Document Type  therapy note (daily note)  -AG  therapy note (daily note)  -LM     Subjective Information  no complaints  -AG  no complaints  -LM     Mode of Treatment  individual therapy;physical therapy  -AG  occupational therapy  -LM     Care Plan Review  care plan/treatment goals reviewed;risks/benefits reviewed  -AG  care plan/treatment goals reviewed;patient/other agree to care plan  -LM     Patient Effort  adequate  -AG  good  -LM     Comment  --  Patient seen this date for adl retraining, fxl mobility, sitting balance, bed mobility.  Patient requires mod/max verbal cues and redirection.  Patient supervision with sitting balance, min assist with bed mobility.  -LM     Existing Precautions/Restrictions  --  fall;seizures  -LM     Patient Response to Treatment  patient supine, alert today.  Pt. able to sit EOB and perform LE ther ex.  Per chart, patient is to be d/c back to SNF today.   -AG  --     Recorded by [AG]  Nancy Moore, PT 08/20/20 1415 [LM] Yadira Salcedo, OT 08/20/20 1530     Row Name 08/20/20 1407 08/20/20 1355          Cognitive Assessment/Intervention- PT/OT    Orientation Status (Cognition)  --  oriented x 3  -LM     Follows Commands (Cognition)  follows one step commands;repetition of directions required;verbal cues/prompting required  -AG  WFL  -LM     Safety Deficit (Cognitive)  moderate deficit  -AG  moderate deficit;severe deficit;awareness of need for assistance;impulsivity;insight into deficits/self awareness;judgment;problem solving;safety precautions awareness  -LM     Recorded by [AG] Nancy Moore, PT 08/20/20 1415 [LM] Yadira Salcedo, OT 08/20/20 1530     Row Name 08/20/20 1407             Safety Issues, Functional Mobility    Impairments Affecting Function (Mobility)  balance;cognition;strength  -AG      Recorded by [AG] Nancy Moore, PT 08/20/20 1415      Row Name 08/20/20 1407             Mobility Assessment/Intervention    Left Lower Extremity (Weight-bearing Status)  full weight-bearing (FWB)  -AG      Right Lower Extremity (Weight-bearing Status)  full weight-bearing (FWB)  -AG      Recorded by [AG] Nancy Moore, PT 08/20/20 1415      Row Name 08/20/20 1407             Bed Mobility Assessment/Treatment    Scooting/Bridging [place] (Bed Mobility)  verbal cues;nonverbal cues (demo/gesture);moderate assist (50% patient effort)  -AG      Supine-Sit [place] (Bed Mobility)  verbal cues;nonverbal cues (demo/gesture);moderate assist (50% patient effort)  -AG      Sit-Supine [place] (Bed Mobility)  verbal cues;nonverbal cues (demo/gesture);moderate assist (50% patient effort);maximum assist (25% patient effort)  -AG      Bed Mobility, Safety Issues  cognitive deficits limit understanding;decreased use of arms for pushing/pulling;decreased use of legs for bridging/pushing;impaired trunk control for bed mobility  -AG      Assistive Device (Bed Mobility)  bed rails;draw sheet  -AG       Recorded by [AG] Nancy Moore, PT 08/20/20 1415      Row Name 08/20/20 1407             Motor Skills Assessment/Interventions    Additional Documentation  Balance (Group);Balance Interventions (Group)  -AG      Recorded by [AG] Nancy Moore, PT 08/20/20 1415      Row Name 08/20/20 1407             Therapeutic Exercise    Therapeutic Exercise  seated, lower extremities  -AG      Additional Documentation  Therapeutic Exercise (Row)  -AG      Recorded by [AG] Nancy Moore, PT 08/20/20 1415      Row Name 08/20/20 1407             Lower Extremity Seated Therapeutic Exercise    Performed, Seated Lower Extremity (Therapeutic Exercise)  ankle dorsiflexion/plantarflexion;LAQ (long arc quad), knee extension  -AG      Expected Outcomes, Seated Lower Extremity (Therapeutic Exercise)  improve functional tolerance, community activity;improve functional tolerance, household activity;improve functional tolerance, self-care activity;improve performance, transfer skills;strengthen normal movement patterns;strengthen, facilitate independent active range of motion  -AG      Sets/Reps Detail, Seated Lower Extremity (Therapeutic Exercise)  2 x 10  -AG      Comment, Seated Lower Extremity (Therapeutic Exercise)  cognitive deficit  -AG      Recorded by [AG] Nancy Moore, PT 08/20/20 1415      Row Name 08/20/20 1407             Balance    Balance  static sitting balance;dynamic sitting balance  -AG      Recorded by [AG] Nancy Moore, PT 08/20/20 1415      Row Name 08/20/20 1407             Static Sitting Balance    Level of [place] (Unsupported Sitting, Static Balance)  contact guard assist;minimal assist, 75% patient effort  -AG      Sitting Position (Unsupported Sitting, Static Balance)  sitting on edge of bed  -AG      Time Able to Maintain Position (Unsupported Sitting, Static Balance)  more than 5 minutes  -AG      Recorded by [AG] Nancy Moore, PT 08/20/20 1415      Row Name 08/20/20 1407 08/20/20 4572           Positioning and Restraints    Pre-Treatment Position  in bed  -AG  --     Post Treatment Position  bed  -AG  bed  -LM     In Bed  supine;call light within reach;encouraged to call for assist;side rails up x3;heels elevated  -AG  call light within reach;encouraged to call for assist  -LM     Recorded by [AG] Nancy Moore, PT 08/20/20 1415 [LM] Yadira Salcedo, OT 08/20/20 1530     Row Name 08/20/20 1407             Pain Scale: FACES Pre/Post-Treatment    Pain: FACES Scale, Pretreatment  0-->no hurt  -AG      Pain: FACES Scale, Post-Treatment  0-->no hurt  -AG      Recorded by [AG] Nancy Moore, PT 08/20/20 1415      Row Name                Wound 08/17/20 0451 medial;proximal coccyx Fissure    Wound - Properties Group Date first assessed: 08/17/20 [AM] Time first assessed: 0451 [AM] Present on Hospital Admission: Y [AM] Orientation: medial;proximal [AM] Location: coccyx [AM] Primary Wound Type: Fissure [AM] Recorded by:  [AM] Harshad Dickinson, RN 08/17/20 0452    Row Name 08/20/20 1407             Plan of Care Review    Plan of Care Reviewed With  patient  -AG      Progress  improving  -AG      Recorded by [AG] Nancy Moore, PT 08/20/20 1415      Row Name 08/20/20 1407             Outcome Summary/Treatment Plan (PT)    Daily Summary of Progress (PT)  progress towards functional goals is fair  -AG      Barriers to Overall Progress (PT)  cognitive deficit  -AG      Plan for Continued Treatment (PT)  continue per POC  -AG      Anticipated Discharge Disposition (PT)  extended care facility  -AG      Recorded by [AG] Nancy Moore, PT 08/20/20 1415        User Key  (r) = Recorded By, (t) = Taken By, (c) = Cosigned By    Initials Name Effective Dates Discipline    AG Nancy Moore, PT 04/03/18 -  PT    LM Yadira Salcedo, OT 03/14/16 -  OT    AM Harshad Dickinson, RN 06/17/20 -  Nurse        Wound 08/17/20 0451 medial;proximal coccyx Fissure (Active)   Dressing Appearance open to air 8/20/2020  9:47 AM   Closure Open  to air 8/20/2020  9:47 AM   Base pink;blanchable 8/20/2020  9:47 AM   Periwound intact 8/20/2020  9:47 AM   Periwound Temperature warm 8/20/2020  9:47 AM   Periwound Skin Turgor soft 8/20/2020  9:47 AM   Drainage Amount none 8/20/2020  9:47 AM   Care, Wound cleansed with;sterile normal saline 8/20/2020  9:47 AM   Dressing Care, Wound open to air 8/20/2020  9:47 AM   Periwound Care, Wound barrier ointment applied 8/20/2020  9:47 AM       Occupational Therapy Education                 Title: PT OT SLP Therapies (In Progress)     Topic: Occupational Therapy (Resolved)     Point: ADL training (Resolved)     Description:   Instruct learner(s) on proper safety adaptation and remediation techniques during self care or transfers.   Instruct in proper use of assistive devices.              Learning Progress Summary           Patient Acceptance, E, NR,NL by CC at 8/19/2020 1707    Acceptance, E,D, VU,DU,NR by LM at 8/17/2020 1432                   Point: Home exercise program (Resolved)     Description:   Instruct learner(s) on appropriate technique for monitoring, assisting and/or progressing therapeutic exercises/activities.              Learning Progress Summary           Patient Acceptance, E, NR,NL by CC at 8/19/2020 1707    Acceptance, E,D, VU,DU,NR by LM at 8/17/2020 1432                   Point: Precautions (Resolved)     Description:   Instruct learner(s) on prescribed precautions during self-care and functional transfers.              Learning Progress Summary           Patient Acceptance, E, NR,NL by CC at 8/19/2020 1707    Acceptance, E,D, VU,DU,NR by LM at 8/17/2020 1432                   Point: Body mechanics (Resolved)     Description:   Instruct learner(s) on proper positioning and spine alignment during self-care, functional mobility activities and/or exercises.              Learning Progress Summary           Patient Acceptance, E, NR,NL by CC at 8/19/2020 1707    Acceptance, E,D, VU,DU,NR by LM at 8/17/2020  1432                               User Key     Initials Effective Dates Name Provider Type Discipline    LM 03/14/16 -  Yadira Salcedo OT Occupational Therapist OT    CC 01/15/20 -  Pauline Henriquez, SATURN[place] Registered Nurse Nurse                OT Recommendation and Plan  Planned Therapy Interventions (OT Eval): activity tolerance training, BADL retraining, patient/caregiver education/training, ROM/therapeutic exercise, strengthening exercise, transfer/mobility retraining, adaptive equipment training          Time Calculation:     Therapy Charges for Today     Code Description Service Date Service Provider Modifiers Qty    [id number]  OT SELF CARE/MGMT/TRAIN EA 15 MIN 8/20/2020 Yadira Salcedo, OT GO 2               Yadira Salcedo OT  8/20/2020

## 2020-08-20 NOTE — DISCHARGE PLACEMENT REQUEST
"Salma Brush (55 y.o. Female)     Date of Birth Social Security Number Address Home Phone MRN    1965  [address] [address] [phone] [id number]    Spiritism Marital Status          None Single       Admission Date Admission Type Admitting Provider Attending Provider Department, Room/Bed    8/16/20 Emergency Sushma Valdez DO Srinivas, Priyanka, MD [address], 3315/1S    Discharge Date Discharge Disposition Discharge Destination         Nursing Facility (DC - External)              Attending Provider:  Kyara Browning MD    Allergies:  No Known Allergies    Isolation:  None   Infection:  None   Code Status:  CPR    Ht:  152.4 cm (60\")   Wt:  89 kg (196 lb 1.6 oz)    Admission Cmt:  None   Principal Problem:  Chest pain [R07.9]                 Active Insurance as of 8/16/2020     Primary Coverage     Payor Plan Insurance Group Employer/Plan Group    TEN[place]ARE MEDICAID TENNCARE      Payor Plan Address Payor Plan Phone Number Payor Plan Fax Number Effective Dates    PO  [phone]  8/16/2020 - 8/16/2020    [facility] [id number]       Subscriber Name Subscriber Birth Date Member ID       SALMA BRUSH 1965 [id number]                 Emergency Contacts      (Rel.) Home Phone Work Phone Mobile Phone    NAPOLEONEDA (Guardian) [phone] -- --    GRACE BRUSH (Relative) [phone] -- --            Emergency Contact Information     Name Relation Home Work Mobile    EDA BRUSH Guardian [phone]      NAPOLEON GRACE Relative [phone]            Insurance Information                TENNCARE/MEDICAID TENNCARE Phone: [phone]    Subscriber: Salma Brush Subscriber#: [id number]    Group#:  Precert#:           Treatment Team     Provider Relationship Specialty Contact    Kyara Browning MD Attending, Physician of Record Hospitalist [phone]    Jef Pablo MD Consulting " Physician Interventional Cardiology [phone]    Nancy Moore, PT Physical Therapist Physical Therapy     Caroline Jimenez, RRT Respiratory Therapist -- [phone]    Lanny Reyes, PCT Patient Care Technician --     Emma Sosa, RN Registered Nurse --           Problem List           Codes Noted - Resolved       Hospital    * (Principal) Chest pain ICD-10-CM: R07.9  ICD-9-CM: 786.50 2020 - Present    History of CVA (cerebrovascular accident) (Chronic) ICD-10-CM: Z86.73  ICD-9-CM: V12.54 2020 - Present    History of seizures (Chronic) ICD-10-CM: Z87.898  ICD-9-CM: V12.49 2020 - Present    Intellectual disability (Chronic) ICD-10-CM: F79  ICD-9-CM: 319 2020 - Present             History & Physical      Sabi Helm PA-C at 20 0348     Attestation signed by Sushma Valdez DO at 20 0833    I have read the documentation below and agree.                       [facility] Medicine Services  HISTORY & PHYSICAL    Patient Identification:  Name:  Marguerite Brush  Age:  55 y.o.  Sex:  female  :  1965  MRN:  [id number]   Visit Number:  [id number]  Primary Care Physician:  Alonso Mendez MD     Subjective     Chief complaint:   Chief Complaint   Patient presents with   • Fall   • Chest Pain     History of presenting illness:   Patient is a 55 y.o. female with past medical history significant for seizures, history of CVA, intellectual disability, COPD, that presented to the [facility] emergency department for evaluation of chest pain and recent fall.    It should be of note that the patient has an intellectual disability and a history of a CVA and is therefore unable to give an extensive history of presenting illness.  She is able to answer simple yes and no questions.  She is able to verbally communicate but her speech is difficult to understand.  Upon evaluation at bedside, when questioned she was hurting anywhere she pointed  to her chest and right shoulder.  She denies any headache or abdominal pain.  She did point and show me the laceration on the occipital portion of her head.    SATURNINO Patricia was able to speak with with the patient's nurse at [facility] in [place], [place] who stated that the patient got up by herself yesterday evening, which she typically does not do, to unplug her TV and she fell backwards hitting her head on a piece of furniture.  The nursing home believes that the patient experienced some chest pain as she pointed to her chest and said it hurt after she fell.  They are unsure if she lost consciousness but they do not believe that she did. The nursing home stated that she is on a reduced carb, mechanical soft and thin liquid diet.  Currently awaiting records from [facility].     SATURNINO Patricia present at bedside during exam.    Upon arrival to the ED, vitals were temperature 98.3 °F, pulse 92, respirations 18, /94, SPO2 98% on room air.  Troponin T negative x2.  CMP with glucose 225, alkaline phosphatase 165, AST 39.  C-reactive protein 4.38.  Lactate 2.6, repeat 1.2.  CBC WBC 12.67, otherwise unremarkable.  Blood cultures pending x2.  Chest x-ray shows low volume image shows bronchovascular crowding favored over mild central vascular congestion, asymmetric opacities on the left are nonspecific, no dense consolidation or effusion.  CT of abdomen with contrast shows 2.2 cm gallstone, no evidence of colitis or gastroenteritis or obstruction.  CT of cervical spine shows degenerative changes no acute abnormality.  CT of head without contrast shows no acute intracranial process, no evidence of intracranial hemorrhage, scalp hematoma posteriorly on the right, chronic appearing encephalomalacic change from remote infarct in the left MCA territory, atrophy and probable chronic ischemic changes.  EKG shows normal sinus rhythm, heart rate 97 bpm, QTc 434 MS.    In the emergency department the patient  received p.o. aspirin 324 mg, IV Zofran 4 mg, IV normal saline 1000 mill bolus.    Patient has been admitted to the telemetry floor as an observation patient for further evaluation and treatment.  ---------------------------------------------------------------------------------------------------------------------   Review of Systems   Unable to perform ROS: Other (Limited review of systems due to patient's intellectual disability)   Cardiovascular: Positive for chest pain.   Gastrointestinal: Negative for abdominal pain and nausea.   Skin: Positive for wound (Laceration occipital lobe).   Neurological: Negative for headaches.      ---------------------------------------------------------------------------------------------------------------------   Past Medical History:   Diagnosis Date   • COPD (chronic obstructive pulmonary disease) (CMS/[facility])    • History of CVA (cerebrovascular accident) 8/17/2020   • History of seizures 8/17/2020   • Intellectual disability 8/17/2020     No past surgical history on file.  History reviewed. No pertinent family history.  Social History     Socioeconomic History   • Marital status: Single     Spouse name: Not on file   • Number of children: Not on file   • Years of education: Not on file   • Highest education level: Not on file   Tobacco Use   • Smoking status: Never Smoker   • Smokeless tobacco: Never Used     ---------------------------------------------------------------------------------------------------------------------   Allergies:  Patient has no known allergies.  ---------------------------------------------------------------------------------------------------------------------   Medications below are reported home medications pulling from within the system; at this time, these medications have not been reconciled unless otherwise specified and are in the verification process for further verifcation as current home medications.    Prior to Admission Medications     None          ---------------------------------------------------------------------------------------------------------------------    Objective     Hospital Scheduled Meds:    sodium chloride 10 mL Intravenous Q12H          Current listed hospital scheduled medications may not yet reflect those currently placed in orders that are signed and held, awaiting patient's arrival to floor/unit.    ---------------------------------------------------------------------------------------------------------------------   Vital Signs:  Temp:  [98.3 °F (36.8 °C)-98.9 °F (37.2 °C)] 98.9 °F (37.2 °C)  Heart Rate:  [] 99  Resp:  [18] 18  BP: (122-146)/(80-94) 144/93  Mean Arterial Pressure (Non-Invasive) for the past 24 hrs (Last 3 readings):   Noninvasive MAP (mmHg)   08/17/20 0329 97   08/17/20 0049 96   08/16/20 2254 105     SpO2 Percentage    08/17/20 0225 08/17/20 0234 08/17/20 0329   SpO2: 98% 98% 94%     SpO2:  [94 %-99 %] 94 %  on  Flow (L/min):  [2] 2;   Device (Oxygen Therapy): room air    Body mass index is 39.04 kg/m².  Wt Readings from Last 3 Encounters:   08/17/20 90.7 kg (199 lb 14.4 oz)     ---------------------------------------------------------------------------------------------------------------------   Physical Exam:  Physical Exam   Constitutional: She appears well-developed and well-nourished. She is cooperative.   HENT:   Head: Normocephalic and atraumatic.   Nose: Nose normal.   Eyes: Conjunctivae and lids are normal. Right eye exhibits no discharge. Left eye exhibits no discharge. Right conjunctiva is not injected. Left conjunctiva is not injected.   Neck: Normal range of motion and full passive range of motion without pain. No JVD present. No muscular tenderness present. Carotid bruit is not present.   Cardiovascular: Normal rate, regular rhythm, normal heart sounds, intact distal pulses and normal pulses. Exam reveals no gallop, no friction rub and no decreased pulses.   No murmur heard.  Pulses:        Popliteal pulses are 2+ on the right side, and 2+ on the left side.        Dorsalis pedis pulses are 2+ on the right side, and 2+ on the left side.   Pulmonary/Chest: Effort normal. No tachypnea and no bradypnea. No respiratory distress. She has no decreased breath sounds. She has no wheezes. She has rhonchi (clears with coughing ) in the right upper field and the left upper field.   Abdominal: Soft. Normal appearance and bowel sounds are normal. She exhibits no distension and no mass. There is no tenderness.   Musculoskeletal:        Right lower leg: She exhibits no edema.        Left lower leg: She exhibits no edema.     Vascular Status -  Her right foot exhibits normal foot vasculature  and no edema. Her left foot exhibits normal foot vasculature  and no edema.  Skin Integrity  -  Her right foot skin is intact.Her left foot skin is intact..  Neurological: She is alert. She is disoriented (Oriented to herself and month of her birthday. Disoriented to location, year, month. ).   Skin: Abrasion noted. No erythema.        Psychiatric: She has a normal mood and affect. Her mood appears not anxious. She does not exhibit a depressed mood.     ---------------------------------------------------------------------------------------------------------------------  EKG:    Pending cardiology read.  Per my evaluation, normal sinus rhythm with heart rate 97 bpm, QTc 434 MS.    Telemetry:    NSR, HR 92 bpm, SpO2 94% on RA.     I have personally reviewed the EKG/Telemetry strip  ---------------------------------------------------------------------------------------------------------------------   Results from last 7 days   Lab Units 08/17/20  0351 08/17/20  0111 08/16/20  2250   TROPONIN T ng/mL <0.010 <0.010 <0.010     Results from last 7 days   Lab Units 08/16/20  2225   PROBNP pg/mL 18.4     Results from last 7 days   Lab Units 08/17/20  0421   CHOLESTEROL mg/dL 164   TRIGLYCERIDES mg/dL 110   HDL CHOL mg/dL 59   LDL CHOL  mg/dL 83       Results from last 7 days   Lab Units 08/17/20  0421 08/17/20  0255 08/16/20  2225   CRP mg/dL  --   --  4.38*   LACTATE mmol/L  --  1.2 2.6*   WBC 10*3/mm3 9.93  --  12.67*   HEMOGLOBIN g/dL 14.0  --  14.4   HEMATOCRIT % 43.5  --  43.4   MCV fL 99.8*  --  96.7   MCHC g/dL 32.2  --  33.2   PLATELETS 10*3/mm3 194  --  208   INR   --   --  1.00     Results from last 7 days   Lab Units 08/17/20  0421 08/16/20  2225   SODIUM mmol/L 141 137   POTASSIUM mmol/L 3.9 4.6   CHLORIDE mmol/L 106 101   CO2 mmol/L 24.5 23.2   BUN mg/dL 9 11   CREATININE mg/dL 0.55* 0.67   EGFR IF NONAFRICN AM mL/min/1.73 115 91   CALCIUM mg/dL 8.8 9.2   GLUCOSE mg/dL 166* 225*   ALBUMIN g/dL 4.00 4.03   BILIRUBIN mg/dL 0.3 0.2   ALK PHOS U/L 158* 165*   AST (SGOT) U/L 20 39*   ALT (SGPT) U/L 30 32   Estimated Creatinine Clearance: 116 mL/min (A) (by C-G formula based on SCr of 0.55 mg/dL (L)).  No results found for: AMMONIA    No results found for: HGBA1C, POCGLU  No results found for: HGBA1C  No results found for: TSH, FREET4    Pain Management Panel     There is no flowsheet data to display.        I have personally reviewed the above laboratory results.   ---------------------------------------------------------------------------------------------------------------------  Imaging Results (Last 7 Days)     Procedure Component Value Units Date/Time    CT Abdomen With Contrast [[id number]] Collected:  08/16/20 2343     Updated:  08/16/20 2345    Narrative:       CT Abdomen W    INDICATION:   Abdominal pain tonight with suspected colitis or gastroenteritis.    TECHNIQUE:   CT of the abdomen with IV contrast. Coronal and sagittal reconstructions were obtained.  Radiation dose reduction techniques included automated exposure control or exposure modulation based on body size. Count of known CT and cardiac nuc med studies  performed in previous 12 months: 0.     COMPARISON:   None available.    FINDINGS:  Lung bases are clear. The liver,  spleen, pancreas, adrenal glands and kidneys are normal. The gallbladder contains a calcified stone measuring 2.2 cm in diameter. The bowel is normal. There is no evidence of bowel obstruction or colitis. Aorta is normal  in size. There is no adenopathy. Bones are unremarkable.      Impression:       2.2 cm gallstone    No evidence of colitis or gastroenteritis. No evidence of obstruction          Signer Name: Delta Quinonez MD   Signed: 8/16/2020 11:43 PM   Workstation Name: [facility]    CT Cervical Spine Without Contrast [[id number]] Collected:  08/16/20 2218     Updated:  08/16/20 2220    Narrative:       CT Spine Cervical WO    INDICATION:   Neck pain after fall today with posterior laceration TECHNIQUE:   CT of the cervical spine without IV contrast. Coronal and sagittal reconstructions were obtained.  Radiation dose reduction techniques included automated exposure control or exposure modulation based on body size. Count of known CT and cardiac nuc med  studies performed in previous 12 months: 0.     COMPARISON:  None available.    FINDINGS:  The alignment is normal. There is disc space narrowing at C2-3, C3-4, and C4-5.. No fractures visible. Patient's head is rotated to the left significantly. The C1 ring is intact      Impression:       Degenerative changes. No acute abnormality    Signer Name: Delta Quinonez MD   Signed: 8/16/2020 10:18 PM   Workstation Name: [facility]    XR Chest 1 View [[id number]] Collected:  08/16/20 2136     Updated:  08/16/20 2139    Narrative:       CR Chest 1 Vw    INDICATION:   55-year-old female in the emergency department with chest pain.     COMPARISON:    None available.    FINDINGS:  Single portable AP view(s) of the chest.  There is rotation to the right. Cardiac silhouette within normal limits for technique. Shallow lung expansion with bronchovascular crowding. There is asymmetric atelectasis or faint  infiltrate in the left midlung  and perihilar lung extending into the left base. No pneumothorax or dense consolidation. There is gaseous distention of the stomach.      Impression:         1. Low-volume image with bronchovascular crowding favored over mild central vascular congestion.  2. Asymmetric opacities on the left are nonspecific. No dense consolidation or effusion.    Signer Name: Minh Chen MD   Signed: 8/16/2020 9:36 PM   [facility]    [facility]    CT Head Without Contrast [[id number]] Collected:  08/16/20 2134     Updated:  08/16/20 2136    Narrative:       CT Head WO    HISTORY:   55-year-old female with a headache. Fell with altered mental status. Laceration to the posterior aspect of the head. In the emergency Department.    TECHNIQUE:   Axial unenhanced head CT. Radiation dose reduction techniques included automated exposure control or exposure modulation based on body size. Count of known CT and cardiac nuc med studies performed in previous 12 months: 0.     Time of scan: 2111 hours    COMPARISON:   None.    FINDINGS:     Motion degraded study. These were the best images possible.    There is extensive encephalomalacic change in the left MCA territory extending into the left parieto-occipital lobe and extending into the vertex. Background atrophy and probable mild periventricular chronic ischemic change. No evidence of acute  intracranial hemorrhage. There is no mass, mass effect or edema to suggest acute infarct and no extra-axial fluid collection is present. No midline shift. There is a scalp hematoma posteriorly on the right measuring 5.5 x 0.6 cm. No underlying calvarial  fracture. The globes are intact and the bones are intact. Sinuses are clear.      Impression:         1. No clearly acute intracranial process. No evidence of acute intracranial hemorrhage.  2. Scalp hematoma posteriorly on the right.  3. Chronic appearing encephalomalacic change  from remote infarct in the left MCA territory.  4. Atrophy and probable chronic ischemic changes..          Signer Name: Minh Chen MD   Signed: 8/16/2020 9:34 PM   Workstation Name: [facility]    [facility]        I have personally reviewed the above radiology results.   ---------------------------------------------------------------------------------------------------------------------    Assessment & Plan      Active Hospital Problems    Diagnosis POA   • **Chest pain [R07.9] Yes   • History of CVA (cerebrovascular accident) [Z86.73] Not Applicable   • History of seizures [Z87.898] Not Applicable   • Intellectual disability [F79] Yes     · Chest pain with unknown features: Patient has intellectual disability and is therefore unable to elaborate on the nature of her chest pain.  She answers simple yes and no questions and will point to her chest when asked if she is hurting.  Troponin T has been negative x2.  Continue to trend troponin and obtain serial EKGs.  Fasting a.m. lipid panel ordered.  Obtain transthoracic echo.  UDS ordered.  Patient is to be n.p.o. in anticipation for a chemical stress test.  Received loading dose aspirin in the emergency department.  Continue with daily aspirin.     · History of CVA with debility and recent fall with subsequent scalp hematoma on occipital portion of head: CT of head shows no acute intracranial process or intracranial hemorrhage, scalp hematoma posteriorly on the right, chronic appearing encephalomalacia change from remote infarct in the left MCA territory, atrophy and probable chronic ischemic changes.  CT of cervical spine shows no acute abnormality, degenerative changes. Nursing home is unsure, but does not believe the patient had a syncopal episode. We will continue with a transthoracic echo as stated above and obtain orthostatic vitals. Received IV NS 1000 mL bolus in ED. Patient is to be up with assistance, fall precautions in place.  PT/OT consulted. Wound care PRN for laceration.     · Severe sepsis criteria present upon admission due to unknown etiology (lactate 2.6 > repeat 1.2, CrP 4.38, pulse 102, WBC 12.67): CXR shows no consolidation or effusion. CT of abdomen/pelvis revealed 2.2 cm gallstone, no evidence of colitis or gastroenteritis. UA ordered. Repeat AM CBC and CrP. IV NS 75 mL/h. Blood cultures pending x 2. Continue to monitor closely.     · Hyperglycemia present upon admission with no known history of diabetes mellitus: Hgb A1c ordered.     · Elevated AST (39): repeat AM CMP. If still elevated or worsened, obtain acute hepatitis panel.     · History of seizures: seizure precautions in place. Continue home antiepileptic medications once reconciled per pharmacy.     · ? History of CHF: currently awaiting nursing home records. PO lasix is on patients medication list. No prior echo to review. Appears compensated at this time. Monitor for signs of volume overload with daily weights, strict I's and O's.     · Anxiety: Abundio ordered. Supportive care. Plan to continue home medications once reconciled per pharmacy.     · Intellectual disability: SLP consulted. Aspiration precautions in place. NH states diet is mechanical soft, thin liquid. PT/OT consulted. Up with assistance, fall precautions.     · Obesity: BMI 39.04 kg/m2.     · F/E/N: IV NS 75 mL/h. Replace electrolytes as necessary. NPO.   ---------------------------------------------------  DVT Prophylaxis: SCDS  GI Prophylaxis: Protonix  Activity: Up with assistance, fall precautions    OBSERVATION status, however if further evaluation or treatment plans warrant, status may change.  Based upon current information, I predict patient's care encounter to be less than or equal to 2 midnights.    Code Status: FULL CODE     I have discussed the patient's assessment and plan with the patient, nursing staff, and attending physician       Sabi Helm PA-C  Hospitalist Service --  [facility]       08/17/20  05:09    Attending Physician: Sushma Valdez DO        Electronically signed by Sushma Valdez DO at 08/17/20 0823       Vital Signs (last day)     Date/Time   Temp   Temp src   Pulse   Resp   BP   Patient Position   SpO2    08/20/20 0656   97.6 (36.4)   Axillary   71   20   118/66   Lying   93    08/20/20 0300   97.1 (36.2)   Axillary   71   18   106/67   Lying   96    08/19/20 2223   --   --   73   --   117/68   --   --    08/19/20 1900   98.4 (36.9)   Oral   75   20   96/48   Lying   94    08/19/20 1552   98.8 (37.1)   Oral   76   16   137/79   Lying   94    08/19/20 1137   98.3 (36.8)   Oral   87   18   125/75   Lying   97    08/19/20 1105   --   --   --   --   --   --   92    08/19/20 0803   98.4 (36.9)   Oral   86   20   117/64   Lying   94    08/19/20 0300   99 (37.2)   Axillary   84   20   119/60   Lying   94    08/19/20 0000   --   --   84   --   --   --   93              Lines, Drains & Airways    Active LDAs     Name:   Placement date:   Placement time:   Site:   Days:    Peripheral IV 08/17/20 1918 Left;Posterior Hand   08/17/20 1918    Hand   2                  Current Facility-Administered Medications   Medication Dose Route Frequency Provider Last Rate Last Dose   • acetaminophen (TYLENOL) tablet 650 mg  650 mg Oral Q6H PRN Sabi Helm PA-C       • aspirin chewable tablet 81 mg  81 mg Oral Daily Sabi Helm PA-C   81 mg at 08/20/20 0800   • atorvastatin (LIPITOR) tablet 20 mg  20 mg Oral Nightly Kyara Browning MD   20 mg at 08/19/20 2224   • bisacodyl (DULCOLAX) EC tablet 10 mg  10 mg Oral Daily PRN Sushma Valdez DO       • bismuth subsalicylate (PEPTO BISMOL) chewable tablet 524 mg  524 mg Oral Q1H PRN Sabi Helm PA-C       • cefTRIAXone (ROCEPHIN) 2 g/100 mL 0.9% NS IVPB (MBP)  2 g Intravenous Q24H Kyara Browning MD   Stopped at 08/19/20 9459   • [START ON 8/21/2020] cholecalciferol (VITAMIN D3)  capsule 50,000 Units  50,000 Units Oral Weekly Sushma Valdez DO       • diazePAM (VALIUM) tablet 5 mg  5 mg Oral BID Sushma Valdez DO   5 mg at 08/20/20 0800   • gabapentin (NEURONTIN) capsule 300 mg  300 mg Oral Nightly Sushma Valdez DO   300 mg at 08/19/20 2227   • heparin (porcine) 5000 UNIT/ML injection 5,000 Units  5,000 Units Subcutaneous Q8H Kyara Browning MD   5,000 Units at 08/20/20 0534   • isosorbide dinitrate (ISORDIL) tablet 10 mg  10 mg Oral TID - Nitrates Zhane Renee APRN   10 mg at 08/20/20 0759   • lactobacillus acidophilus (RISAQUAD) capsule 1 capsule  1 capsule Oral Daily Kyara Browning MD   1 capsule at 08/20/20 0800   • loperamide (IMODIUM) capsule 2 mg  2 mg Oral Q6H PRN Sushma Valdez DO       • magnesium hydroxide (MILK OF MAGNESIA) suspension 2400 mg/10mL 10 mL  10 mL Oral Daily PRN Sushma Valdez DO       • metFORMIN (GLUCOPHAGE) tablet 500 mg  500 mg Oral BID With Meals Kyara Browning MD   500 mg at 08/20/20 0759   • metoprolol succinate XL (TOPROL-XL) 24 hr tablet 12.5 mg  12.5 mg Oral Q24H Kyara Browning MD   12.5 mg at 08/20/20 0800   • nitroglycerin (NITROSTAT) SL tablet 0.4 mg  0.4 mg Sublingual Q5 Min PRN Sushma Valdez DO       • PARoxetine (PAXIL) tablet 30 mg  30 mg Oral Daily Sushma Valdez DO   30 mg at 08/20/20 0800   • phenytoin (DILANTIN) ER capsule 200 mg  200 mg Oral Nightly Sushma Valdez DO   200 mg at 08/19/20 2224   • QUEtiapine (SEROquel) tablet 100 mg  100 mg Oral BID Sushma Valdez DO   100 mg at 08/20/20 0535   • QUEtiapine (SEROquel) tablet 200 mg  200 mg Oral Nightly Sushma Valdez DO   200 mg at 08/19/20 2227   • ramipril (ALTACE) capsule 5 mg  5 mg Oral Nightly Sushma Valdez DO   5 mg at 08/19/20 2226   • rufinamide (BANZEL) tablet 400 mg  400 mg Oral BID Kyara Browning MD   400 mg at 08/20/20 0800   • sennosides-docusate (PERICOLACE) 8.6-50 MG per  tablet 2 tablet  2 tablet Oral Daily Kyara Browning MD   2 tablet at 08/20/20 0801   • sodium chloride 0.9 % flush 10 mL  10 mL Intravenous PRN Sushma Valdez DO       • sodium chloride 0.9 % flush 10 mL  10 mL Intravenous Q12H Sushma Valdez DO   10 mL at 08/19/20 2227   • sodium chloride 0.9 % flush 10 mL  10 mL Intravenous PRN Sushma Valdez DO       • topiramate (TOPAMAX) tablet 200 mg  200 mg Oral BID Sushma Valdez DO   200 mg at 08/20/20 0800       Lab Results (last 24 hours)     Procedure Component Value Units Date/Time    Basic Metabolic Panel [[id number]]  (Abnormal) Collected:  08/20/20 0133    Specimen:  Blood Updated:  08/20/20 0233     Glucose 137 mg/dL      BUN 14 mg/dL      Creatinine 0.54 mg/dL      Sodium 136 mmol/L      Potassium 3.5 mmol/L      Chloride 107 mmol/L      CO2 21.7 mmol/L      Calcium 8.6 mg/dL      eGFR Non African Amer 117 mL/min/1.73      BUN/Creatinine Ratio 25.9     Anion Gap 7.3 mmol/L     Narrative:       GFR Normal >60  Chronic Kidney Disease <60  Kidney Failure <15      Blood Culture - Blood, Arm, Left [[id number]] Collected:  08/16/20 2225    Specimen:  Blood from Arm, Left Updated:  08/19/20 2245     Blood Culture No growth at 3 days    Blood Culture - Blood, Arm, Right [[id number]] Collected:  08/16/20 2225    Specimen:  Blood from Arm, Right Updated:  08/19/20 2245     Blood Culture No growth at 3 days    Urine Culture - Urine, Urine, Catheter In/Out [[id number]]  (Abnormal)  (Susceptibility) Collected:  08/17/20 1556    Specimen:  Urine, Catheter In/Out Updated:  08/19/20 2227     Urine Culture >100,000 CFU/mL Klebsiella pneumoniae ssp pneumoniae    Susceptibility      Klebsiella pneumoniae ssp pneumoniae     WILLARD     Ampicillin Resistant     Ampicillin + Sulbactam Susceptible     Cefazolin Susceptible     Cefepime Susceptible     Ceftazidime Susceptible     Ceftriaxone Susceptible     Gentamicin Susceptible     Levofloxacin Susceptible      Nitrofurantoin Intermediate     Piperacillin + Tazobactam Susceptible     Tetracycline Susceptible     Trimethoprim + Sulfamethoxazole Susceptible                    COVID PRE-OP / PRE-PROCEDURE SCREENING ORDER (NO ISOLATION) - Swab, Nasopharynx [[id number]] Collected:  08/18/20 1539    Specimen:  Swab from Nasopharynx Updated:  08/19/20 1935    Narrative:       The following orders were created for panel order COVID PRE-OP / PRE-PROCEDURE SCREENING ORDER (NO ISOLATION) - Swab, Nasopharynx.  Procedure                               Abnormality         Status                     ---------                               -----------         ------                     COVID-19,[facility], NP ...[[id number]]                      Final result                 Please view results for these tests on the individual orders.    COVID-19,[facility], NP SWAB IN [employer] 24-30 HR TAT - Swab, Nasopharynx [[id number]] Collected:  08/18/20 1539    Specimen:  Swab from Nasopharynx Updated:  08/19/20 1935     Reference Lab Report performed by [facility]     COVID19 Not Detected        Orders (last 24 hrs)      Start     Ordered    08/21/20 0900  cholecalciferol (VITAMIN D3) capsule 50,000 Units  Weekly      08/17/20 0534    08/20/20 0935  Discharge patient  Once     Comments:  [facility] in [place]    08/20/20 0934    08/20/20 0935  Discontinue IV  Once      08/20/20 0934    08/20/20 0900  sennosides-docusate (PERICOLACE) 8.6-50 MG per tablet 2 tablet  Daily      08/19/20 1648    08/20/20 0900  potassium chloride (K-DUR,KLOR-CON) CR tablet 40 mEq  Once      08/20/20 0750    08/20/20 0600  Basic Metabolic Panel  Morning Draw      08/19/20 0933    08/20/20 0000  potassium chloride (K-DUR,KLOR-CON) 20 MEQ CR tablet  Daily      08/20/20 0934    08/20/20 0000  aspirin 81 MG chewable tablet  Daily,   Status:  Discontinued      08/20/20 0934    08/20/20 0000  atorvastatin (LIPITOR) 20 MG tablet  Nightly,   Status:   Discontinued      08/20/20 0934    08/20/20 0000  cefdinir (OMNICEF) 300 MG capsule  2 Times Daily      08/20/20 0934    08/20/20 0000  isosorbide dinitrate (ISORDIL) 10 MG tablet  3 Times Daily (Nitrates)      08/20/20 0934    08/20/20 0000  lactobacillus acidophilus (RISAQUAD) capsule capsule  Daily      08/20/20 0934    08/20/20 0000  metFORMIN (GLUCOPHAGE) 500 MG tablet  2 Times Daily With Meals      08/20/20 0934    08/20/20 0000  metoprolol succinate XL (TOPROL-XL) 25 MG 24 hr tablet  Every 24 Hours Scheduled      08/20/20 0934    08/20/20 0000  nitroglycerin (NITROSTAT) 0.4 MG SL tablet  Every 5 Minutes PRN      08/20/20 0934    08/20/20 0000  Discharge Follow-up with PCP      08/20/20 0934    08/20/20 0000  Discharge Follow-up with Specialty: Cardiology Dr. Pablo; 2 Weeks      08/20/20 0934    08/20/20 0000  Diet: Consistent Carbohydrate, Soft Texture, Cardiac; Thin Liquids, No Restrictions; Ground      08/20/20 0934    08/20/20 0000  Activity as Tolerated      08/20/20 0934    08/20/20 0000  aspirin 81 MG chewable tablet  Daily      08/20/20 0947    08/20/20 0000  atorvastatin (LIPITOR) 20 MG tablet  Nightly      08/20/20 0947    08/19/20 2100  atorvastatin (LIPITOR) tablet 20 mg  Nightly      08/19/20 0930    08/19/20 1800  isosorbide dinitrate (ISORDIL) tablet 10 mg  3 Times Daily (Nitrates)      08/19/20 1355    08/19/20 1100  metoprolol succinate XL (TOPROL-XL) 24 hr tablet 12.5 mg  Every 24 Hours Scheduled      08/19/20 0931    08/19/20 1100  sennosides-docusate (PERICOLACE) 8.6-50 MG per tablet 2 tablet  2 Times Daily,   Status:  Discontinued      08/19/20 0932    08/19/20 0800  metFORMIN (GLUCOPHAGE) tablet 500 mg  2 Times Daily With Meals      08/18/20 1044    08/18/20 1400  heparin (porcine) 5000 UNIT/ML injection 5,000 Units  Every 8 Hours Scheduled      08/18/20 1051    08/18/20 0900  lactobacillus acidophilus (RISAQUAD) capsule 1 capsule  Daily      08/17/20 2111    08/17/20 2230  cefTRIAXone  (ROCEPHIN) 2 g/100 mL 0.9% NS IVPB (MBP)  Every 24 Hours      08/17/20 2117    08/17/20 2100  phenytoin (DILANTIN) ER capsule 200 mg  Nightly      08/17/20 0534    08/17/20 2100  gabapentin (NEURONTIN) capsule 300 mg  Nightly      08/17/20 0534    08/17/20 2100  ramipril (ALTACE) capsule 5 mg  Nightly      08/17/20 0534    08/17/20 2100  QUEtiapine (SEROquel) tablet 200 mg  Nightly      08/17/20 0534    08/17/20 1300  rufinamide (BANZEL) tablet 400 mg  2 times daily      08/17/20 1203    08/17/20 0900  sodium chloride 0.9 % flush 10 mL  Every 12 Hours Scheduled      08/17/20 0404    08/17/20 0900  PARoxetine (PAXIL) tablet 30 mg  Daily      08/17/20 0534    08/17/20 0900  diazePAM (VALIUM) tablet 5 mg  2 times daily      08/17/20 0534    08/17/20 0900  QUEtiapine (SEROquel) tablet 100 mg  2 times daily      08/17/20 0534    08/17/20 0900  topiramate (TOPAMAX) tablet 200 mg  2 Times Daily      08/17/20 0534    08/17/20 0900  aspirin chewable tablet 81 mg  Daily      08/17/20 0512    08/17/20 0532  loperamide (IMODIUM) capsule 2 mg  Every 6 Hours PRN      08/17/20 0534    08/17/20 0532  magnesium hydroxide (MILK OF MAGNESIA) suspension 2400 mg/10mL 10 mL  Daily PRN      08/17/20 0534    08/17/20 0532  bisacodyl (DULCOLAX) EC tablet 10 mg  Daily PRN      08/17/20 0534    08/17/20 0512  acetaminophen (TYLENOL) tablet 650 mg  Every 6 Hours PRN      08/17/20 0513    08/17/20 0410  bismuth subsalicylate (PEPTO BISMOL) chewable tablet 524 mg  Every 1 Hour PRN      08/17/20 0410    08/17/20 0404  sodium chloride 0.9 % flush 10 mL  As Needed      08/17/20 0404    08/17/20 0404  nitroglycerin (NITROSTAT) SL tablet 0.4 mg  Every 5 Minutes PRN      08/17/20 0404 08/16/20 2050  sodium chloride 0.9 % flush 10 mL  As Needed      08/16/20 2050    Unscheduled  Telemetry - Pulse Oximetry  Continuous PRN     Comments:  If Patient Develops Unresponsiveness, Acute Dyspnea, Cyanosis or Suspected Hypoxemia Start Continuous Pulse Ox  Monitoring, Apply Oxygen & Notify Provider    08/17/20 0404    Unscheduled  Oxygen Therapy- Nasal Cannula; Titrate for SPO2: 90% - 95%  Continuous PRN     Comments:  If Patient Develops Unresponsiveness, Acute Dyspnea, Cyanosis or Suspected Hypoxemia Start Continuous Pulse Ox Monitoring, Apply Oxygen & Notify Provider    08/17/20 0404    Unscheduled  ECG 12 Lead  As Needed     Comments:  Nurse to Release if Patient Expericences Acute Chest Pain or Dysrhythmias    08/17/20 0404    Unscheduled  Potassium  As Needed     Comments:  For Ventricular Arrhythmias      08/17/20 0404    Unscheduled  Magnesium  As Needed     Comments:  For Ventricular Arrhythmias      08/17/20 0404    Unscheduled  Troponin  As Needed     Comments:  For Chest Pain      08/17/20 0404    Unscheduled  Digoxin Level  As Needed     Comments:  For Atrial Arrhythmias      08/17/20 0404    Unscheduled  Blood Gas, Arterial  As Needed     Comments:  Per O2 PolicyNotify Physician      08/17/20 0404    Unscheduled  Up With Assistance  As Needed      08/17/20 0404    Unscheduled  Wound Care  As Needed      08/17/20 0513    --  phenytoin (DILANTIN) 100 MG ER capsule  Nightly      08/17/20 0436    --  furosemide (LASIX) 20 MG tablet  Daily      08/17/20 0436    --  gabapentin (NEURONTIN) 300 MG capsule  Nightly      08/17/20 0436    --  PARoxetine (PAXIL) 30 MG tablet  Daily      08/17/20 0436    --  ramipril (ALTACE) 2.5 MG capsule  Daily      08/17/20 0436    --  ramipril (ALTACE) 5 MG capsule  Nightly      08/17/20 0436    --  QUEtiapine (SEROquel) 100 MG tablet  Nightly      08/17/20 0436    --  vitamin D (ERGOCALCIFEROL) 1.25 MG (21726 UT) capsule capsule  Weekly      08/17/20 0436    --  rufinamide (BANZEL) 400 MG tablet  2 times daily      08/17/20 0436    --  diazePAM (VALIUM) 5 MG tablet  2 times daily      08/17/20 0436    --  potassium chloride (K-DUR,KLOR-CON) 20 MEQ CR tablet  2 Times Daily,   Status:  Discontinued      08/17/20 0436    --   QUEtiapine (SEROquel) 100 MG tablet  2 times daily      20    --  topiramate (TOPAMAX) 200 MG tablet  2 Times Daily      20    --  acetaminophen (TYLENOL) 325 MG tablet  Every 4 Hours PRN      20    --  bisacodyl (DULCOLAX) 5 MG EC tablet  Daily PRN      20 043    --  ibuprofen (ADVIL,MOTRIN) 400 MG tablet  Every 8 Hours PRN      20 043    --  loperamide (IMODIUM) 2 MG capsule  Every 6 Hours PRN      20    --  magnesium hydroxide (MILK OF MAGNESIA) 400 MG/5ML suspension  Daily PRN      20    --  ondansetron (ZOFRAN) 4 MG tablet  Every 6 Hours PRN      20                Operative/Procedure Notes (last 24 hours) (Notes from 20 1036 through 20 1036)    No notes of this type exist for this encounter.            Physician Progress Notes (last 24 hours) (Notes from 20 1036 through 20 1036)      Kyara Browning MD at 20 1642              [facility]     Patient Identification:  Name:  Marguerite Brush  Age:  55 y.o.  Sex:  female  :  1965  MRN:  [id number]  Visit Number:  [id number]  ROOM: [address] [place] [address]     Primary Care Provider:  Alonso Mendez MD    Length of stay:  0    Subjective        Chief Compliant Follow up for the chest pain    Patient seen and examined this afternoon with no family at bedside.  Doing fine. Currently denies any chest pain.  Is able to answer in yes and no.  Denies any headache, abdominal pain.  Afebrile no events overnight.  On room air. Had BM yesterday.       Objective     Current Hospital Meds:    aspirin 81 mg Oral Daily   atorvastatin 20 mg Oral Nightly   cefTRIAXone 2 g Intravenous Q24H   [START ON 2020] cholecalciferol 50,000 Units Oral Weekly   diazePAM 5 mg Oral BID   gabapentin 300 mg Oral Nightly   heparin (porcine) 5,000 Units Subcutaneous Q8H   isosorbide dinitrate 10 mg Oral TID - Nitrates   lactobacillus acidophilus 1  capsule Oral Daily   metFORMIN 500 mg Oral BID With Meals   metoprolol succinate XL 12.5 mg Oral Q24H   PARoxetine 30 mg Oral Daily   phenytoin 200 mg Oral Nightly   QUEtiapine 100 mg Oral BID   QUEtiapine 200 mg Oral Nightly   ramipril 5 mg Oral Nightly   rufinamide 400 mg Oral BID   [START ON 8/20/2020] sennosides-docusate 2 tablet Oral Daily   sodium chloride 10 mL Intravenous Q12H   topiramate 200 mg Oral BID      ----------------------------------------------------------------------------------------------------------------------  Vital Signs:  Temp:  [98.3 °F (36.8 °C)-99 °F (37.2 °C)] 98.8 °F (37.1 °C)  Heart Rate:  [76-87] 76  Resp:  [16-20] 16  BP: (117-137)/(60-83) 137/79  SpO2:  [92 %-97 %] 94 %  on   ;   Device (Oxygen Therapy): room air  Body mass index is 38.4 kg/m².    Wt Readings from Last 3 Encounters:   08/19/20 89.2 kg (196 lb 9.6 oz)       Intake/Output Summary (Last 24 hours) at 8/19/2020 1648  Last data filed at 8/19/2020 1249  Gross per 24 hour   Intake 1603 ml   Output --   Net 1603 ml     Diet Soft Texture; Ground; Thin; Consistent Carbohydrate  ----------------------------------------------------------------------------------------------------------------------  Physical exam:  General: Comfortable, Not in distress.  Well-developed and well-nourished.   HENT:  Head:  Normocephalic and atraumatic.  Mouth:  Moist mucous membranes.    Eyes:  Conjunctivae and EOM are normal.  Pupils are equal, round, and reactive to light.  No scleral icterus.    Neck:  Neck supple.  No JVD present.  trachea midline.   Cardiovascular:  Normal rate, regular rhythm with no murmur.  Pulmonary/Chest:  No respiratory distress, no wheezes, no crackles, with normal breath sounds and good air movement. No chest wall tenderness.   Abdomen:  Soft.  Bowel sounds are normal.  No distension and no tenderness.   Musculoskeletal:  No edema, no tenderness, and no deformity.  No red or swollen joints anywhere.    Neurological:   Alert, answering in yes and no. No focal deficits. No facial droop.   Skin:  Skin is warm and dry. No rash noted. No pallor.   Peripheral vascular: pulses in all 4 extremities with no clubbing, no cyanosis, no edema.  Genitourinary: no rosen  ----------------------------------------------------------------------------------------------------------------------  ----------------------------------------------------------------------------------------------------------------------  Results from last 7 days   Lab Units 08/18/20 0116 08/17/20  0523 08/17/20  0421 08/17/20  0255 08/16/20  2225   CRP mg/dL 2.57* 3.76*  --   --  4.38*   LACTATE mmol/L  --   --   --  1.2 2.6*   WBC 10*3/mm3  --   --  9.93  --  12.67*   HEMOGLOBIN g/dL  --   --  14.0  --  14.4   HEMATOCRIT %  --   --  43.5  --  43.4   MCV fL  --   --  99.8*  --  96.7   MCHC g/dL  --   --  32.2  --  33.2   PLATELETS 10*3/mm3  --   --  194  --  208   INR   --   --   --   --  1.00     Results from last 7 days   Lab Units 08/19/20  0308 08/18/20 0116 08/17/20  0523 08/17/20  0421 08/16/20  2225   SODIUM mmol/L 143 142  --  141 137   POTASSIUM mmol/L 4.5 3.3*  --  3.9 4.6   MAGNESIUM mg/dL 2.1  --  1.9  --   --    CHLORIDE mmol/L 113* 108*  --  106 101   CO2 mmol/L 20.8* 19.0*  --  24.5 23.2   BUN mg/dL 8 7  --  9 11   CREATININE mg/dL 0.51* 0.35*  --  0.55* 0.67   PHOSPHORUS mg/dL  --   --  3.3  --   --    EGFR IF NONAFRICN AM mL/min/1.73 125 >150  --  115 91   CALCIUM mg/dL 8.3* 8.4*  --  8.8 9.2   GLUCOSE mg/dL 162* 214*  --  166* 225*   ALBUMIN g/dL  --   --   --  4.00 4.03   BILIRUBIN mg/dL  --   --   --  0.3 0.2   ALK PHOS U/L  --   --   --  158* 165*   AST (SGOT) U/L  --   --   --  20 39*   ALT (SGPT) U/L  --   --   --  30 32   Estimated Creatinine Clearance: 124 mL/min (A) (by C-G formula based on SCr of 0.51 mg/dL (L)).  Results from last 7 days   Lab Units 08/17/20  0809 08/17/20  0351 08/17/20  0111   TROPONIN T ng/mL <0.010 <0.010 <0.010      Hemoglobin A1C   Date/Time Value Ref Range Status   08/17/2020 0421 6.80 (H) 4.80 - 5.60 % Final     No results found for: AMMONIA  Results from last 7 days   Lab Units 08/17/20  0421   CHOLESTEROL mg/dL 164   TRIGLYCERIDES mg/dL 110   HDL CHOL mg/dL 59   LDL CHOL mg/dL 83     Results from last 7 days   Lab Units 08/17/20  1556   NITRITE UA  Negative   WBC UA /HPF 31-50*   BACTERIA UA /HPF 4+*   SQUAM EPITHEL UA /HPF 3-6*   URINECX  >100,000 CFU/mL Gram Negative Bacilli*     No results found for: BLOODCXNo results found for: RESPCXNo results found for: URINECXNo results found for: WOUNDCXNo results found for: BODYFLDCXNo results found for: STOOLCX  I have personally looked at the labs and they are summarized above.  ----------------------------------------------------------------------------------------------------------------------  Imaging Results (Last 24 Hours)     ** No results found for the last 24 hours. **        I have personally reviewed the radiology images and read the final radiology report.    Assessment & Plan      Assessment:  Chest pain post fall  Sepsis secondary to Acute GNB UTI  New DM2 with A1C 6.8  Essential HTN  COPD  Cholelithiasis 2.2 cm  H/O CVA  H/O Seizure  Intellectual Disability  H/O recent fall  Obesity      Plan:  Chest pain post fall, troponin negative.  Nuclear stress test with Myocardial perfusion imaging indicates a small-sized, mild degree of ischemia located in the inferior wall. Cardiology consulted. On ASA. Will start on Toprol XL, Lipitor and Isordil. On ramipril. Lipid panel done normal.     Sepsis secondary to Acute GNB UTI. on Rocephin and lactobacillus.  urine culture prelim with GNB.  Currently patient is afebrile VSS.  Leukocytosis resolved and CRP improving.  Prelim blood cultures are no growth.      New DM2 with A1C 6.8, on carb consistent diet. on metformin from today.     Essential HTN, stable. On Toprol XL and ramipril. DC AM ramipril.     COPD, stable.   Monitor    Cholelithiasis 2.2 cm, outpatient follow-up.    H/O Seizure, stable.  On phenytoin.  Phenytoin level normal.  Heparin sc for the DVT prophylaxis.     Discharge to nursing home in am. COVID test done, pending.      Management plan discussed in detail with nursing and patient.    The patient is high risk due to the following diagnoses/reasons: Chest pain post fall, Sepsis secondary to Acute UTI  New DM2 with A1C 6.8    Kyara Browning MD  08/19/20  16:48    Electronically signed by Kyara Browning MD at 08/19/20 1649          Consult Notes (last 24 hours) (Notes from 08/19/20 1036 through 08/20/20 1036)      Zhane Renee APRN at 08/19/20 1343      Consult Orders    1. Inpatient Cardiology Consult [[id number]] ordered by Kyara Browning MD at 08/19/20 0930                Consults  Date of Admit: 8/16/2020  Date of Consult: 08/19/20  No ref. provider found  Marguerite Gonsalo  1965    Consulting Physician: Jef Pablo MD    Cardiology consultation    Reason for consultation: Abnormal stress test    Assessment:  2. Chest pain in the setting of abnormal nuclear stress test which indicates a small size, mild degree of ischemia in the inferior wall.  Troponins have been negative and there are no acute ischemic changes noted on EKG.  Echocardiogram shows normal LVEF, and no wall motion abnormalities.  3. Dyslipidemia.  8/17/2020 total cholesterol 164, triglycerides 110, and LDL 83.  Patient is on atorvastatin 20 mg      Recommendations:  2. Although patient had an abnormal nuclear stress, a mild degree of ischemia was noted in the inferior wall, she did have a normal echocardiogram which showed no wall motion abnormalities.  Medical management is advised.  3. We will start the patient on low-dose Isordil for her report of chest pain.  She is to continue aspirin, Toprol-XL and atorvastatin.  4. We do feel from a cardiac standpoint the patient is stable.  We will sign off.  5. Thank you for the  consult.      History of Present Illness    Subjective     Chief Complaint   Patient presents with   • Fall   • Chest Pain       Marguerite Brush is a 55 y.o. female with past medical history significant for history of CVA, history of seizures, COPD, and intellectual disability.  The patient presented to the ED on 8/17/2020 with complaint of chest pain and a recent fall.  The patient is a resident at [facility] in [facility].  Due to her complaint of chest pain and nuclear stress test was ordered.  Myocardial perfusion imaging indicates a small size, mild degree of ischemia located in the inferior wall.  Cardiology was consulted due to abnormal stress test.    The patient was seen and examined.  Due to her intellectual disability, it is difficult to get adequate history.  She does indicate that her left chest has been hurting.  We were unable to discover if there were any associated symptoms.    Parts of this history was taken from the medical record given the patient's intellectual disability.      Cardiac risk factors:cerebral vascular disease, obesity, sedentary lifestyle    Last Echo: 8/17/2020    Interpretation Summary     · Normal left ventricular cavity size and wall thickness noted. All left ventricular wall segments contract normally.  · Estimated EF appears to be in the range of 66 - 70%.  · Left ventricular diastolic dysfunction (grade I) consistent with impaired relaxation.  · The aortic valve is structurally normal. No aortic valve regurgitation is present. No aortic valve stenosis is present.  · The mitral valve is normal in structure. No mitral valve regurgitation is present. No significant mitral valve stenosis is present.  · There is no evidence of pericardial effusion.     Last Stress: 8/17/2020    Interpretation Summary     · A pharmacological stress test was performed using regadenoson without low-level exercise.  · Findings consistent with a normal ECG stress test.  · Myocardial  perfusion imaging indicates a small-sized, mild degree of ischemia located in the inferior wall.  · Normal LV cavity size. Normal LV wall motion noted.  · Left ventricular ejection fraction is hyperdynamic (Calculated EF > 70%).  · Impressions are consistent with a low risk study.     Last Cath: N/A      Past Medical History:   Diagnosis Date   • COPD (chronic obstructive pulmonary disease) (CMS/[facility])    • History of CVA (cerebrovascular accident) 8/17/2020   • History of seizures 8/17/2020   • Intellectual disability 8/17/2020     No past surgical history on file.  History reviewed. No pertinent family history.  Social History     Tobacco Use   • Smoking status: Never Smoker   • Smokeless tobacco: Never Used   Substance Use Topics   • Alcohol use: Not on file   • Drug use: Not on file     Medications Prior to Admission   Medication Sig Dispense Refill Last Dose   • diazePAM (VALIUM) 5 MG tablet Take 5 mg by mouth 2 (two) times a day.   8/16/2020 at 0700   • furosemide (LASIX) 20 MG tablet Take 20 mg by mouth Daily.   8/16/2020 at 0700   • gabapentin (NEURONTIN) 300 MG capsule Take 300 mg by mouth Every Night.   8/15/2020 at 2000   • PARoxetine (PAXIL) 30 MG tablet Take 30 mg by mouth Daily.   8/16/2020 at 0700   • phenytoin (DILANTIN) 100 MG ER capsule Take 200 mg by mouth Every Night.   8/15/2020 at 2000   • potassium chloride (K-DUR,KLOR-CON) 20 MEQ CR tablet Take 20 mEq by mouth 2 (Two) Times a Day.   8/16/2020 at 0700   • QUEtiapine (SEROquel) 100 MG tablet Take 200 mg by mouth Every Night.   8/15/2020 at 2000   • QUEtiapine (SEROquel) 100 MG tablet Take 100 mg by mouth 2 (two) times a day. Takes in AM and afternoon.   8/16/2020 at 1200   • ramipril (ALTACE) 2.5 MG capsule Take 2.5 mg by mouth Daily. Hold if SBP < 100   8/16/2020 at 0700   • ramipril (ALTACE) 5 MG capsule Take 5 mg by mouth Every Night. Hold if SBP < 100   8/15/2020 at 1900   • rufinamide (BANZEL) 400 MG tablet Take 400 mg by mouth 2 (two) times  a day.   8/16/2020 at 0700   • topiramate (TOPAMAX) 200 MG tablet Take 200 mg by mouth 2 (Two) Times a Day.   8/16/2020 at 0700   • vitamin D (ERGOCALCIFEROL) 1.25 MG (09080 UT) capsule capsule Take 50,000 Units by mouth 1 (One) Time Per Week. Fridays.   8/14/2020 at 0700   • acetaminophen (TYLENOL) 325 MG tablet Take 650 mg by mouth Every 4 (Four) Hours As Needed for Mild Pain .   Unknown at Unknown time   • bisacodyl (DULCOLAX) 5 MG EC tablet Take 10 mg by mouth Daily As Needed for Constipation.   Unknown at Unknown time   • ibuprofen (ADVIL,MOTRIN) 400 MG tablet Take 400 mg by mouth Every 8 (Eight) Hours As Needed for Mild Pain .   Unknown at Unknown time   • loperamide (IMODIUM) 2 MG capsule Take 2 mg by mouth Every 6 (Six) Hours As Needed for Diarrhea.   Unknown at Unknown time   • magnesium hydroxide (MILK OF MAGNESIA) 400 MG/5ML suspension Take 30 mL by mouth Daily As Needed for Constipation.   Unknown at Unknown time   • ondansetron (ZOFRAN) 4 MG tablet Take 4 mg by mouth Every 6 (Six) Hours As Needed for Nausea or Vomiting.   Unknown at Unknown time     Allergies:  Patient has no known allergies.    Review of Systems   Constitutional: Negative for fatigue.   Respiratory: Negative for shortness of breath.    Cardiovascular: Positive for chest pain. Negative for palpitations and leg swelling.   Gastrointestinal: Negative for blood in stool.   Neurological: Negative for dizziness, syncope, weakness and light-headedness.   Hematological: Does not bruise/bleed easily.   All other systems reviewed and are negative.        Objective      Vital Signs  Temp:  [98.3 °F (36.8 °C)-99 °F (37.2 °C)] 98.3 °F (36.8 °C)  Heart Rate:  [84-91] 87  Resp:  [18-20] 18  BP: (117-136)/(60-83) 125/75  Body mass index is 38.4 kg/m².    Intake/Output Summary (Last 24 hours) at 8/19/2020 1344  Last data filed at 8/19/2020 0500  Gross per 24 hour   Intake 1123 ml   Output --   Net 1123 ml       Physical Exam   Constitutional: She  appears well-developed and well-nourished.   HENT:   Head: Normocephalic and atraumatic.   Eyes: Pupils are equal, round, and reactive to light.   Neck: No JVD present.   Cardiovascular: Normal rate, regular rhythm and intact distal pulses. Exam reveals no gallop and no friction rub.   No murmur heard.  No chest wall pain   Pulmonary/Chest: Effort normal and breath sounds normal. No respiratory distress. She has no wheezes. She has no rales.   Abdominal: Soft. She exhibits no mass. There is no tenderness. No hernia.   Skin: Skin is warm and dry.   Psychiatric: She has a normal mood and affect.   Vitals reviewed.      Telemetry: Sinus 80-100s    Results Review:   I reviewed the patient's new clinical results.  Results from last 7 days   Lab Units 08/17/20  0809 08/17/20  0351 08/17/20  0111 08/16/20  2250   TROPONIN T ng/mL <0.010 <0.010 <0.010 <0.010     Results from last 7 days   Lab Units 08/17/20  0421 08/16/20  2225   WBC 10*3/mm3 9.93 12.67*   HEMOGLOBIN g/dL 14.0 14.4   PLATELETS 10*3/mm3 194 208     Results from last 7 days   Lab Units 08/19/20  0308 08/18/20  0116 08/17/20  0421 08/16/20  2225   SODIUM mmol/L 143 142 141 137   POTASSIUM mmol/L 4.5 3.3* 3.9 4.6   CHLORIDE mmol/L 113* 108* 106 101   CO2 mmol/L 20.8* 19.0* 24.5 23.2   BUN mg/dL 8 7 9 11   CREATININE mg/dL 0.51* 0.35* 0.55* 0.67   CALCIUM mg/dL 8.3* 8.4* 8.8 9.2   GLUCOSE mg/dL 162* 214* 166* 225*   ALT (SGPT) U/L  --   --  30 32   AST (SGOT) U/L  --   --  20 39*     Lab Results   Component Value Date    INR 1.00 08/16/2020     Lab Results   Component Value Date    MG 2.1 08/19/2020    MG 1.9 08/17/2020     Lab Results   Component Value Date    TSH 4.240 (H) 08/17/2020    TRIG 110 08/17/2020    HDL 59 08/17/2020    LDL 83 08/17/2020      No results found for: BNP     EKG:         Imaging Results (Last 72 Hours)     Procedure Component Value Units Date/Time    CT Abdomen With Contrast [[id number]] Collected:  08/16/20 2343     Updated:  08/16/20  2345    Narrative:       CT Abdomen W    INDICATION:   Abdominal pain tonight with suspected colitis or gastroenteritis.    TECHNIQUE:   CT of the abdomen with IV contrast. Coronal and sagittal reconstructions were obtained.  Radiation dose reduction techniques included automated exposure control or exposure modulation based on body size. Count of known CT and cardiac nuc med studies  performed in previous 12 months: 0.     COMPARISON:   None available.    FINDINGS:  Lung bases are clear. The liver, spleen, pancreas, adrenal glands and kidneys are normal. The gallbladder contains a calcified stone measuring 2.2 cm in diameter. The bowel is normal. There is no evidence of bowel obstruction or colitis. Aorta is normal  in size. There is no adenopathy. Bones are unremarkable.      Impression:       2.2 cm gallstone    No evidence of colitis or gastroenteritis. No evidence of obstruction          Signer Name: Delta Quinonez MD   Signed: 8/16/2020 11:43 PM   Workstation Name: [email or web address]    [facility]    CT Cervical Spine Without Contrast [[id number]] Collected:  08/16/20 2218     Updated:  08/16/20 2220    Narrative:       CT Spine Cervical WO    INDICATION:   Neck pain after fall today with posterior laceration TECHNIQUE:   CT of the cervical spine without IV contrast. Coronal and sagittal reconstructions were obtained.  Radiation dose reduction techniques included automated exposure control or exposure modulation based on body size. Count of known CT and cardiac nuc med  studies performed in previous 12 months: 0.     COMPARISON:  None available.    FINDINGS:  The alignment is normal. There is disc space narrowing at C2-3, C3-4, and C4-5.. No fractures visible. Patient's head is rotated to the left significantly. The C1 ring is intact      Impression:       Degenerative changes. No acute abnormality    Signer Name: Delta Quinonez MD   Signed: 8/16/2020 10:18 PM   Workstation Name: [email or web address]     [facility]    XR Chest 1 View [[id number]] Collected:  08/16/20 2136     Updated:  08/16/20 2139    Narrative:       CR Chest 1 Vw    INDICATION:   55-year-old female in the emergency department with chest pain.     COMPARISON:    None available.    FINDINGS:  Single portable AP view(s) of the chest.  There is rotation to the right. Cardiac silhouette within normal limits for technique. Shallow lung expansion with bronchovascular crowding. There is asymmetric atelectasis or faint infiltrate in the left midlung  and perihilar lung extending into the left base. No pneumothorax or dense consolidation. There is gaseous distention of the stomach.      Impression:         1. Low-volume image with bronchovascular crowding favored over mild central vascular congestion.  2. Asymmetric opacities on the left are nonspecific. No dense consolidation or effusion.    Signer Name: Minh Chen MD   Signed: 8/16/2020 9:36 PM   Workstation Name: [facility]-    [facility]    CT Head Without Contrast [[id number]] Collected:  08/16/20 2134     Updated:  08/16/20 2136    Narrative:       CT Head WO    HISTORY:   55-year-old female with a headache. Fell with altered mental status. Laceration to the posterior aspect of the head. In the emergency Department.    TECHNIQUE:   Axial unenhanced head CT. Radiation dose reduction techniques included automated exposure control or exposure modulation based on body size. Count of known CT and cardiac nuc med studies performed in previous 12 months: 0.     Time of scan: 2111 hours    COMPARISON:   None.    FINDINGS:     Motion degraded study. These were the best images possible.    There is extensive encephalomalacic change in the left MCA territory extending into the left parieto-occipital lobe and extending into the vertex. Background atrophy and probable mild periventricular chronic ischemic change. No evidence of acute  intracranial hemorrhage.  There is no mass, mass effect or edema to suggest acute infarct and no extra-axial fluid collection is present. No midline shift. There is a scalp hematoma posteriorly on the right measuring 5.5 x 0.6 cm. No underlying calvarial  fracture. The globes are intact and the bones are intact. Sinuses are clear.      Impression:         1. No clearly acute intracranial process. No evidence of acute intracranial hemorrhage.  2. Scalp hematoma posteriorly on the right.  3. Chronic appearing encephalomalacic change from remote infarct in the left MCA territory.  4. Atrophy and probable chronic ischemic changes..          Signer Name: Minh Chen MD   Signed: 2020 9:34 PM   Workstation Name: [facility]    [facility] [place]             Thank you very much for asking us to be involved in this patient's care.  We will follow along with you.    JONNA Umaña   20  1:44 PM        Electronically signed by Zhane Renee APRN at 20 1402          Physical Therapy Notes (last 24 hours) (Notes from 20 1036 through 20 1036)      Nancy Moore, PT at 20 1302  Version 1 of 1            20 1302   Rehab Treatment   Discipline physical therapist   Reason Treatment Not Performed patient/family declined treatment  (pt. drowsy.  PT unable to arouse/ engage patient for participation.)       Electronically signed by Nancy Moore, PT at 20 1303       Occupational Therapy Notes (last 24 hours) (Notes from 20 1036 through 20 1036)    No notes exist for this encounter.            Speech Language Pathology Notes (last 24 hours) (Notes from 20 1037 through 20 1037)      Paz Callahan MS CCC-SLP at 20 1449          Acute Care - Speech Language Pathology   Swallow Treatment Note PRABHA Aguero     Patient Name: Marguerite Brush  : 1965  MRN: [id number]  Today's Date: 2020  Onset of Illness/Injury or Date of Surgery: 20      Referring Physician: Dr. Valdez      Admit Date: 8/16/2020    Visit Dx:      ICD-10-CM ICD-9-CM   1. Chest pain, unspecified type R07.9 786.50   2. Fall from ground level W18.30XA E888.9     Patient Active Problem List   Diagnosis   • Chest pain   • History of CVA (cerebrovascular accident)   • History of seizures   • Intellectual disability       Therapy Treatment  Rehabilitation Treatment Summary     Wound 08/17/20 0451 medial;proximal coccyx Fissure     Row Name    Wound - Properties Group    Date first assessed: 08/17/20 [AM] Time first assessed: 0451 [AM] Present on Hospital Admission: Y [AM] Orientation: medial;proximal [AM] Location: coccyx [AM] Primary Wound Type: Fissure [AM] Recorded by:  [AM] Recorded by: Harshad Dickinson RN, Recorded at: 08/17/20 0452            User Key     Initials Name Effective Dates Discipline    AM Harshad Dickinson RN 06/17/20 -  Nurse         Ms. Brush is seen this am at bedside for diet safety/accepatnce. She is s/p clinical dysphagia assessment yesterday w/ SLP recs for mechanical soft, ground w/ thin liquids. Per chart review pt is tolerating diet well w/o overt s/s of aspiration.    Ms. Brush is awake upon entry, sitting upright in bed w/ tray table in front of her w/ coloring books. She denies any difficulty w/ current level of po intake. She is agreeable to accept po intake for diet safety/acceptance. She accepts multiple presentations of thin liquids via cup and straw, wfl. No overt s/s of aspiration. No s/s concerning for silent aspiration. Pt agreeable to accept trials of crumbled rosalind cracker. Bolus formation, manipulation and control are generally weak w/ increased oral prep time w/ mixed phasic/rotary mastication pattern. A-p transit is delayed w/ mild oral residue w/ rosalind cracker. Requires alternating liquids/solids to clear remaining residue from oral cavity.    She is felt to most benefit from modified po diet of mechanical soft, ground w/ thin liquids.  Meds whole in puree. Crushed prn. Upright and centered for all po intake.  This is felt to be most representative of pt's baseline modified po diet. No further SLP f/u warranted at this time.     Thank you-  Paz Callahan M.S., CCC-SLP    Outcome Summary    Continue current modified po diet.      EDUCATION  The patient has been educated in the following areas:   Dysphagia (Swallowing Impairment) Oral Care/Hydration Modified Diet Instruction.    SLP Recommendation and Plan    Recommendations:  1. Mechanical soft, ground w/ thin liquids.  2. Meds whole in puree. Crushed prn.  3. Upright and centered for all po intake  4. SIDDHARTH precautions.  5. Oral care protocol     Time Calculation:       Therapy Charges for Today     Code Description Service Date Service Provider Modifiers Qty    [id number]  ST EVAL ORAL PHARYNG SWALLOW 4 8/18/2020 Paz Callahan, MS CCC-SLP GN 1    [id number]  ST TREATMENT SWALLOW 3 8/19/2020 Paz Callahan MS CCC-SLP GN 1                 Paz Callahan MS CCC-SLP  8/19/2020    Electronically signed by Paz Callahan, MS CCC-SLP at 08/19/20 9410     Paz Callahan, MS CCC-SLP at 08/19/20 1447        SLP f/u this am for diet safety/acceptance. Pt continues to accept modified po diet of mechanical soft, ground w/ thin liquids. No overt s/s of aspiration. No s/s concerning for silent aspiration. This is felt to be most representative of pt's baseline modified po diet. No further SLP f/u warranted at this time.  Problem: Patient Care Overview  Goal: Plan of Care Review  Outcome: Ongoing (interventions implemented as appropriate)       Electronically signed by Paz Callahan MS CCC-SLP at 08/19/20 1445       ADL Documentation (last day)     Date/Time Presence of Pain Transferring Toileting Bathing Dressing Eating Communication Swallowing    08/20/20 0947  denies pain/discomfort  --  --  --  --  --  --  --    08/20/20 0705  non-verbal indicators absent  --  --  --  --  --  --  --     08/20/20 0508  non-verbal indicators absent  --  --  --  --  --  --  --    08/20/20 0315  non-verbal indicators absent  --  --  --  --  --  --  --    08/20/20 0119  non-verbal indicators absent  --  --  --  --  --  --  --    08/19/20 2315  non-verbal indicators absent  --  --  --  --  --  --  --    08/19/20 2220  denies pain/discomfort  4 - completely dependent  4 - completely dependent  4 - completely dependent  4 - completely dependent  2 - assistive person  2 - difficulty understanding and speaking (not related to language barrier)  0 - swallows foods/liquids without difficulty    08/19/20 1920  denies pain/discomfort  --  --  --  --  --  --  --    08/19/20 0904  denies pain/discomfort  4 - completely dependent  4 - completely dependent  4 - completely dependent  4 - completely dependent  2 - assistive person  0 - understands/communicates without difficulty  0 - swallows foods/liquids without difficulty              ·   Your medications have changed     START taking:   ? aspirin   ? atorvastatin (LIPITOR)   ? cefdinir (OMNICEF)   ? isosorbide dinitrate (ISORDIL)   ? lactobacillus acidophilus   ? metFORMIN (GLUCOPHAGE)   ? metoprolol succinate XL (TOPROL-XL)   ? nitroglycerin (NITROSTAT)   CHANGE how you take:   ? potassium chloride (K-DUR,KLOR-CON) -- when to take this   STOP taking:   ? furosemide 20 MG tablet (LASIX)   ? ibuprofen 400 MG tablet (ADVIL,MOTRIN)   ? ramipril 2.5 MG capsule (ALTACE)   ? ramipril 5 MG capsule (ALTACE)   Review your updated medication list below.   Your Next Steps   Ask   If you have any questions about your recovery, please call the [facility] Nurse Call Center at [phone]. A registered nurse is available 24 hours a day 7 days a week to assist you.   If you have any COVID-19 related questions, please call [phone].  ·   Activity instructions     Activity as Tolerated   ·   Diet instructions     Diet: Consistent Carbohydrate, Soft Texture, Cardiac; Thin Liquids, No  Restrictions; Ground   Discharge Diet:   Consistent Carbohydrate  Soft Texture  Cardiac   Fluid Consistency:   Thin Liquids, No Restrictions   Soft Options:   Ground   ·   Other instructions     Discharge Follow-up with PCP   Currently Documented PCP:   Alonso Mendez MD   PCP Phone Number:   [phone]   Discharge Follow-up with Specialty: Cardiology Dr. Pablo; 2 Weeks   What's Next     What's Next          Follow up with Alonso Mendez MD   Within 1 week  [address] [place] [address]   [facility] [address]   [phone]    Your Allergies   Date Reviewed: 2020   Your Allergies   No active allergies   Patient Belongings Returned     Document Return of Belongings Flowsheet     Were the patient bedside belongings sent home? --   Medications Retrieved from Pharmacy & Sent Home --   Belongings Sent to Safe --   Belongings sent with: --   Belongings Retrieved from Security & Sent Home --       [employer] Signup     [employer] allows you to send messages to your doctor, view your test results, renew your prescriptions, schedule appointments, and more. To sign up, go to [employer] and click on the Sign Up Now link in the New User? box. Enter your [employer] Activation Code exactly as it appears below along with the last four digits of your Social Security Number and your Date of Birth () to complete the sign-up process. If you do not sign up before the expiration date, you must request a new code.     [employer] Activation Code: [email or web address]  Expires: 2020  9:37 AM     If you have questions, you can email [email or web address] or call [phone] to talk to our [employer] staff. Remember, [employer] is NOT to be used for urgent needs. For medical emergencies, dial 911.     Medication List   Medication List     Morning Afternoon Evening Bedtime As Needed    acetaminophen 325 MG tablet   Commonly known as: TYLENOL   Take 650 mg by mouth Every 4 (Four) Hours As Needed for Mild Pain .           aspirin 81 MG chewable tablet   Chew 1 tablet Daily for 30 days.   Last time this was given: 81 mg on August 20, 2020  8:00 AM          atorvastatin 20 MG tablet   Commonly known as: LIPITOR   Take 1 tablet by mouth Every Night for 30 days.   Last time this was given: 20 mg on August 19, 2020 10:24 PM          bisacodyl 5 MG EC tablet   Commonly known as: DULCOLAX   Take 10 mg by mouth Daily As Needed for Constipation.          cefdinir 300 MG capsule   Commonly known as: OMNICEF   Take 1 capsule by mouth 2 (Two) Times a Day for 4 days.          diazePAM 5 MG tablet   Commonly known as: VALIUM   Take 5 mg by mouth 2 (two) times a day.   Last time this was given: 5 mg on August 20, 2020  8:00 AM          gabapentin 300 MG capsule   Commonly known as: NEURONTIN   Take 300 mg by mouth Every Night.   Last time this was given: 300 mg on August 19, 2020 10:27 PM          isosorbide dinitrate 10 MG tablet   Commonly known as: ISORDIL   Take 1 tablet by mouth 3 (Three) Times a Day for 30 days.   Last time this was given: 10 mg on August 20, 2020  7:59 AM          lactobacillus acidophilus capsule capsule   Take 1 capsule by mouth Daily for 7 days.   Last time this was given: 1 capsule on August 20, 2020  8:00 AM          loperamide 2 MG capsule   Commonly known as: IMODIUM   Take 2 mg by mouth Every 6 (Six) Hours As Needed for Diarrhea.          magnesium hydroxide 400 MG/5ML suspension   Commonly known as: MILK OF MAGNESIA   Take 30 mL by mouth Daily As Needed for Constipation.          metFORMIN 500 MG tablet   Commonly known as: GLUCOPHAGE   Take 1 tablet by mouth 2 (Two) Times a Day With Meals for 30 days.   Last time this was given: 500 mg on August 20, 2020  7:59 AM          metoprolol succinate XL 25 MG 24 hr tablet   Commonly known as: TOPROL-XL   Take 0.5 tablets by mouth Daily for 30 days.   Last time this was given: 12.5 mg on August 20, 2020  8:00 AM          nitroglycerin 0.4 MG SL tablet   Commonly known  as: NITROSTAT   Place 1 tablet under the tongue Every 5 (Five) Minutes As Needed for Chest Pain (Only if SBP Greater Than 100). Take no more than 3 doses in 15 minutes.          ondansetron 4 MG tablet   Commonly known as: ZOFRAN   Take 4 mg by mouth Every 6 (Six) Hours As Needed for Nausea or Vomiting.   Last time this was given: Ask your nurse or doctor          PARoxetine 30 MG tablet   Commonly known as: PAXIL   Take 30 mg by mouth Daily.   Last time this was given: 30 mg on August 20, 2020  8:00 AM          phenytoin 100 MG ER capsule   Commonly known as: DILANTIN   Take 200 mg by mouth Every Night.   Last time this was given: 200 mg on August 19, 2020 10:24 PM          potassium chloride 20 MEQ CR tablet   Commonly known as: K-DUR,KLOR-CON   Take 1 tablet by mouth Daily.   What changed: when to take this   Last time this was given: 40 mEq on August 20, 2020  9:44 AM          * QUEtiapine 100 MG tablet   Commonly known as: SEROquel   Take 200 mg by mouth Every Night.   Last time this was given: Ask your nurse or doctor          * QUEtiapine 100 MG tablet   Commonly known as: SEROquel   Take 100 mg by mouth 2 (two) times a day. Takes in AM and afternoon.   Last time this was given: Ask your nurse or doctor          rufinamide 400 MG tablet   Commonly known as: BANZEL   Take 400 mg by mouth 2 (two) times a day.   Last time this was given: 400 mg on August 20, 2020  8:00 AM          topiramate 200 MG tablet   Commonly known as: TOPAMAX   Take 200 mg by mouth 2 (Two) Times a Day.   Last time this was given: 200 mg on August 20, 2020  8:00 AM          vitamin D 1.25 MG (10556 UT) capsule capsule   Commonly known as: ERGOCALCIFEROL   Take 50,000 Units by mouth 1 (One) Time Per Week. Fridays.         (very important)  * This list has 2 medication(s) that are the same as other medications prescribed for you. Read the directions carefully, and ask your doctor or other care provider to review them with you.   Where to   your medications     ·   Ask your doctor where to  these medications     ? • aspirin 81 MG chewable tablet   ? • atorvastatin 20 MG tablet   ? • cefdinir 300 MG capsule   ? • isosorbide dinitrate 10 MG tablet   ? • lactobacillus acidophilus capsule capsule   ? • metFORMIN 500 MG tablet   ? • metoprolol succinate XL 25 MG 24 hr tablet   ? • nitroglycerin 0.4 MG SL tablet   ? • potassium chloride 20 MEQ CR tablet   Always carry an updated list of your medications with you. If there is an emergency, a responder can quickly see what medications you are taking. Take this paperwork with you the next time you see your health care provider.        Jackelyn Sign-Up        Opioid Resource     “If you or someone you know needs information on substance abuse, please visit www.[place].org for listings of facilities and resources across [place].”  Stroke Symptoms     · Call 911 or have someone take you to the Emergency Department if you have any of the following:  · Sudden numbness or weakness of your face, arm or leg especially on one side of the body  · Sudden confusion, difficulty speaking or trouble understanding   · Changes in your vision or loss of sight in one eye  · Sudden severe headache with no known cause  · Sudden dizziness, trouble walking, loss of balance or coordination     It is important to seek emergency care right away if you have further stroke symptoms. If you get emergency help quickly, the powerful clot-dissolving medicines can reduce the disabilities caused by a stroke.      For more information:  American Stroke Association  4-024-8-STROKE  www.strokeassociation.org  IF YOU SMOKE OR USE TOBACCO PLEASE READ THE FOLLOWING:  Why is smoking bad for me?  Smoking increases the risk of heart disease, lung disease, vascular disease, stroke, and cancer. If you smoke, STOP!     For more information:  [place] Tobacco QuitLine  1-800-QUIT-NOW  http://www.tnquitline.org     Suicidal Feelings     If  you feel like life is too tough and are thinking of suicide or injuring yourself, get help right away!  · Call 911  · Call a suicide hotline to speak to a counselor. 7-108-119-TALK or 3-905-UKBPWTT   Patient Experience     Thank you for choosing [facility]. You may receive a survey following your visit. Please take a moment to share what went well, where we need improvement, and which staff members deserve recognition. We value your input.           YOU ARE THE MOST IMPORTANT FACTOR IN YOUR RECOVERY.      Follow all instructions carefully.      I have reviewed my discharge instructions with my nurse, including the following information, if applicable:                 Information about my illness and diagnosis              Follow up appointments (including lab draws)              Wound Care              Equipment Needs              Medications (new and continuing) along with side effects              Preventative information such as vaccines and smoking cessations              Diet              Pain              I know when to contact my Doctor's office or seek emergency care        I want my nurse to describe the side effects of my medications: YES NO   If the answer is no, I understand the side effects of my medications: YES NO   My nurse described the side effects of my medications in a way that I could understand: YES NO   I have taken my personal belongings and my own medications with me at discharge: YES NO       I have received this information and my questions have been answered. I have discussed any concerns I see with this plan with the nurse or physician. I understand these instructions.     Signature of Patient or Responsible Person: _____________________________________     Date: _________________  Time: __________________     Signature of Healthcare Provider: _______________________________________  Date: _________________  Time: __________________          Discharge Summary    No notes of this type  exist for this encounter.         Discharge Order (From admission, onward)     Start     Ordered    08/20/20 0935  Discharge patient  Once     Comments:  [facility] in [place]   Expected Discharge Date:  08/20/20    Discharge Disposition:  Nursing Facility (DC - External)    Physician of Record for Attribution - Please select from Treatment Team:  JED ZHONG [[id number]]    Review needed by CMO to determine Physician of Record:  No       Question Answer Comment   Physician of Record for Attribution - Please select from Treatment Team JED ZHONG    Review needed by CMO to determine Physician of Record No        08/20/20 0934

## 2020-08-21 LAB
BACTERIA SPEC AEROBE CULT: NORMAL
BACTERIA SPEC AEROBE CULT: NORMAL

## 2022-05-28 ENCOUNTER — LAB (OUTPATIENT)
Dept: LAB | Facility: HOSPITAL | Age: 57
End: 2022-05-28

## 2022-05-28 ENCOUNTER — TRANSCRIBE ORDERS (OUTPATIENT)
Dept: ADMINISTRATIVE | Facility: HOSPITAL | Age: 57
End: 2022-05-28

## 2022-05-28 DIAGNOSIS — N39.0 URINARY TRACT INFECTION WITHOUT HEMATURIA, SITE UNSPECIFIED: Primary | ICD-10-CM

## 2022-05-28 LAB
BACTERIA UR QL AUTO: ABNORMAL /HPF
BILIRUB UR QL STRIP: NEGATIVE
CLARITY UR: ABNORMAL
COLOR UR: YELLOW
GLUCOSE UR STRIP-MCNC: NEGATIVE MG/DL
HGB UR QL STRIP.AUTO: NEGATIVE
HYALINE CASTS UR QL AUTO: ABNORMAL /LPF
KETONES UR QL STRIP: NEGATIVE
LEUKOCYTE ESTERASE UR QL STRIP.AUTO: NEGATIVE
NITRITE UR QL STRIP: POSITIVE
PH UR STRIP.AUTO: 7 [PH] (ref 5–8)
PROT UR QL STRIP: NEGATIVE
RBC # UR STRIP: ABNORMAL /HPF
REF LAB TEST METHOD: ABNORMAL
SP GR UR STRIP: 1.01 (ref 1–1.03)
SQUAMOUS #/AREA URNS HPF: ABNORMAL /HPF
UROBILINOGEN UR QL STRIP: ABNORMAL
WBC # UR STRIP: ABNORMAL /HPF

## 2022-05-28 PROCEDURE — 81001 URINALYSIS AUTO W/SCOPE: CPT | Performed by: FAMILY MEDICINE

## 2022-05-28 PROCEDURE — 87086 URINE CULTURE/COLONY COUNT: CPT | Performed by: FAMILY MEDICINE

## 2022-05-28 PROCEDURE — 87077 CULTURE AEROBIC IDENTIFY: CPT | Performed by: FAMILY MEDICINE

## 2022-05-28 PROCEDURE — 87186 SC STD MICRODIL/AGAR DIL: CPT | Performed by: FAMILY MEDICINE

## 2022-05-31 LAB
BACTERIA SPEC AEROBE CULT: ABNORMAL
BACTERIA SPEC AEROBE CULT: ABNORMAL

## 2022-06-17 ENCOUNTER — HOSPITAL ENCOUNTER (EMERGENCY)
Facility: HOSPITAL | Age: 57
Discharge: NURSING, REHAB OR LONG-TERM CARE | End: 2022-06-18
Attending: STUDENT IN AN ORGANIZED HEALTH CARE EDUCATION/TRAINING PROGRAM | Admitting: STUDENT IN AN ORGANIZED HEALTH CARE EDUCATION/TRAINING PROGRAM

## 2022-06-17 ENCOUNTER — APPOINTMENT (OUTPATIENT)
Dept: CT IMAGING | Facility: HOSPITAL | Age: 57
End: 2022-06-17

## 2022-06-17 DIAGNOSIS — R56.9 SEIZURE: Primary | ICD-10-CM

## 2022-06-17 LAB
ALBUMIN SERPL-MCNC: 3.86 G/DL (ref 3.5–5.2)
ALBUMIN/GLOB SERPL: 1.4 G/DL
ALP SERPL-CCNC: 137 U/L (ref 39–117)
ALT SERPL W P-5'-P-CCNC: 17 U/L (ref 1–33)
ANION GAP SERPL CALCULATED.3IONS-SCNC: 12.4 MMOL/L (ref 5–15)
AST SERPL-CCNC: 16 U/L (ref 1–32)
BASOPHILS # BLD AUTO: 0.06 10*3/MM3 (ref 0–0.2)
BASOPHILS NFR BLD AUTO: 0.8 % (ref 0–1.5)
BILIRUB SERPL-MCNC: 0.2 MG/DL (ref 0–1.2)
BUN SERPL-MCNC: 11 MG/DL (ref 6–20)
BUN/CREAT SERPL: 19 (ref 7–25)
CALCIUM SPEC-SCNC: 9.1 MG/DL (ref 8.6–10.5)
CHLORIDE SERPL-SCNC: 106 MMOL/L (ref 98–107)
CO2 SERPL-SCNC: 21.6 MMOL/L (ref 22–29)
CREAT SERPL-MCNC: 0.58 MG/DL (ref 0.57–1)
CRP SERPL-MCNC: 0.8 MG/DL (ref 0–0.5)
DEPRECATED RDW RBC AUTO: 47.3 FL (ref 37–54)
EGFRCR SERPLBLD CKD-EPI 2021: 105.7 ML/MIN/1.73
EOSINOPHIL # BLD AUTO: 0.24 10*3/MM3 (ref 0–0.4)
EOSINOPHIL NFR BLD AUTO: 3.3 % (ref 0.3–6.2)
ERYTHROCYTE [DISTWIDTH] IN BLOOD BY AUTOMATED COUNT: 13 % (ref 12.3–15.4)
GLOBULIN UR ELPH-MCNC: 2.8 GM/DL
GLUCOSE SERPL-MCNC: 145 MG/DL (ref 65–99)
HCT VFR BLD AUTO: 39.6 % (ref 34–46.6)
HGB BLD-MCNC: 12.7 G/DL (ref 12–15.9)
HOLD SPECIMEN: NORMAL
HOLD SPECIMEN: NORMAL
IMM GRANULOCYTES # BLD AUTO: 0.04 10*3/MM3 (ref 0–0.05)
IMM GRANULOCYTES NFR BLD AUTO: 0.5 % (ref 0–0.5)
LYMPHOCYTES # BLD AUTO: 2.72 10*3/MM3 (ref 0.7–3.1)
LYMPHOCYTES NFR BLD AUTO: 37.1 % (ref 19.6–45.3)
MCH RBC QN AUTO: 31.9 PG (ref 26.6–33)
MCHC RBC AUTO-ENTMCNC: 32.1 G/DL (ref 31.5–35.7)
MCV RBC AUTO: 99.5 FL (ref 79–97)
MONOCYTES # BLD AUTO: 0.52 10*3/MM3 (ref 0.1–0.9)
MONOCYTES NFR BLD AUTO: 7.1 % (ref 5–12)
NEUTROPHILS NFR BLD AUTO: 3.75 10*3/MM3 (ref 1.7–7)
NEUTROPHILS NFR BLD AUTO: 51.2 % (ref 42.7–76)
NRBC BLD AUTO-RTO: 0 /100 WBC (ref 0–0.2)
PHENYTOIN SERPL-MCNC: 15.3 MCG/ML (ref 10–20)
PLAT MORPH BLD: NORMAL
PLATELET # BLD AUTO: 130 10*3/MM3 (ref 140–450)
PMV BLD AUTO: 11.6 FL (ref 6–12)
POTASSIUM SERPL-SCNC: 3.9 MMOL/L (ref 3.5–5.2)
PROT SERPL-MCNC: 6.7 G/DL (ref 6–8.5)
RBC # BLD AUTO: 3.98 10*6/MM3 (ref 3.77–5.28)
RBC MORPH BLD: NORMAL
SODIUM SERPL-SCNC: 140 MMOL/L (ref 136–145)
WBC NRBC COR # BLD: 7.33 10*3/MM3 (ref 3.4–10.8)
WHOLE BLOOD HOLD SPECIMEN: NORMAL

## 2022-06-17 PROCEDURE — 85025 COMPLETE CBC W/AUTO DIFF WBC: CPT | Performed by: NURSE PRACTITIONER

## 2022-06-17 PROCEDURE — 96374 THER/PROPH/DIAG INJ IV PUSH: CPT

## 2022-06-17 PROCEDURE — 99283 EMERGENCY DEPT VISIT LOW MDM: CPT

## 2022-06-17 PROCEDURE — 80185 ASSAY OF PHENYTOIN TOTAL: CPT | Performed by: NURSE PRACTITIONER

## 2022-06-17 PROCEDURE — 96375 TX/PRO/DX INJ NEW DRUG ADDON: CPT

## 2022-06-17 PROCEDURE — 70450 CT HEAD/BRAIN W/O DYE: CPT

## 2022-06-17 PROCEDURE — 25010000002 LORAZEPAM PER 2 MG: Performed by: NURSE PRACTITIONER

## 2022-06-17 PROCEDURE — 0 LEVETIRACETAM IN NACL 0.75% 1000 MG/100ML SOLUTION: Performed by: NURSE PRACTITIONER

## 2022-06-17 PROCEDURE — 85007 BL SMEAR W/DIFF WBC COUNT: CPT | Performed by: NURSE PRACTITIONER

## 2022-06-17 PROCEDURE — 80053 COMPREHEN METABOLIC PANEL: CPT | Performed by: NURSE PRACTITIONER

## 2022-06-17 PROCEDURE — 86140 C-REACTIVE PROTEIN: CPT | Performed by: NURSE PRACTITIONER

## 2022-06-17 RX ORDER — LORAZEPAM 2 MG/ML
0.5 INJECTION INTRAMUSCULAR ONCE
Status: COMPLETED | OUTPATIENT
Start: 2022-06-17 | End: 2022-06-17

## 2022-06-17 RX ORDER — SODIUM CHLORIDE 0.9 % (FLUSH) 0.9 %
10 SYRINGE (ML) INJECTION AS NEEDED
Status: DISCONTINUED | OUTPATIENT
Start: 2022-06-17 | End: 2022-06-18 | Stop reason: HOSPADM

## 2022-06-17 RX ORDER — LEVETIRACETAM 10 MG/ML
1000 INJECTION INTRAVASCULAR ONCE
Status: COMPLETED | OUTPATIENT
Start: 2022-06-17 | End: 2022-06-17

## 2022-06-17 RX ADMIN — LEVETIRACETAM 1000 MG: 10 INJECTION INTRAVENOUS at 20:22

## 2022-06-17 RX ADMIN — LORAZEPAM 0.5 MG: 2 INJECTION, SOLUTION INTRAMUSCULAR; INTRAVENOUS at 20:31

## 2022-06-18 VITALS
WEIGHT: 176.6 LBS | SYSTOLIC BLOOD PRESSURE: 135 MMHG | RESPIRATION RATE: 18 BRPM | OXYGEN SATURATION: 98 % | BODY MASS INDEX: 31.29 KG/M2 | DIASTOLIC BLOOD PRESSURE: 74 MMHG | HEART RATE: 64 BPM | TEMPERATURE: 98.4 F | HEIGHT: 63 IN

## 2022-06-18 NOTE — ED PROVIDER NOTES
Subjective   Patient is a 57-year-old female with significant past medical history positive for COPD, CVA, seizures, MR presenting to the ER from [facility] complaints of seizure.  Patient is nonverbal and HPI comes from nursing home staff.  Patient's nursing home staff reports that the patient had a seizure lasting about 1 minute.  Patient has not had a seizure in several years.  Nursing home staff denies any vomiting, diarrhea, shortness of breath, recent illness or infections, or any additional symptoms today.      History provided by:  Patient  History limited by:  Mental status change   used: No        Review of Systems   Unable to perform ROS: Patient nonverbal   Neurological: Positive for seizures.       Past Medical History:   Diagnosis Date   • COPD (chronic obstructive pulmonary disease) (HCC)    • History of CVA (cerebrovascular accident) 8/17/2020   • History of seizures 8/17/2020   • Intellectual disability 8/17/2020       No Known Allergies    History reviewed. No pertinent surgical history.    History reviewed. No pertinent family history.    Social History     Socioeconomic History   • Marital status: Single   Tobacco Use   • Smoking status: Never Smoker   • Smokeless tobacco: Never Used           Objective   Physical Exam  Vitals and nursing note reviewed.   Constitutional:       General: She is not in acute distress.     Appearance: She is well-developed. She is not ill-appearing, toxic-appearing or diaphoretic.   HENT:      Head: Normocephalic and atraumatic.      Right Ear: External ear normal.      Left Ear: External ear normal.      Nose: Nose normal. No congestion or rhinorrhea.   Eyes:      Conjunctiva/sclera: Conjunctivae normal.      Pupils: Pupils are equal, round, and reactive to light.   Neck:      Vascular: No JVD.      Trachea: No tracheal deviation.   Cardiovascular:      Rate and Rhythm: Normal rate and regular rhythm.      Heart sounds: Normal  heart sounds. No murmur heard.  Pulmonary:      Effort: Pulmonary effort is normal. No respiratory distress.      Breath sounds: Normal breath sounds. No wheezing.   Abdominal:      General: Bowel sounds are normal.      Palpations: Abdomen is soft.      Tenderness: There is no abdominal tenderness.   Musculoskeletal:         General: No deformity. Normal range of motion.      Cervical back: Normal range of motion and neck supple.   Skin:     General: Skin is warm and dry.      Capillary Refill: Capillary refill takes less than 2 seconds.      Coloration: Skin is not pale.      Findings: No erythema or rash.   Neurological:      Mental Status: She is alert. Mental status is at baseline.         Procedures           ED Course  ED Course as of 06/17/22 2130 Fri Jun 17, 2022 2115 CT Head Without Contrast  IMPRESSION:  Impression:  1. No clearly acute intracranial pathology demonstrated.  2. Generalized cerebral atrophy and nonspecific periventricular hypodensities which are thought to represent white matter microvascular change. Prominent area of encephalomalacia in the left cerebral hemisphere involving the vascular distribution of the  left middle cerebral artery.     Signer Name: Parminder Engel MD   Signed: 6/17/2022 9:11 PM   Workstation Name: [id number]    [facility] [SM]   2115 Work up and results were discussed throughly with the patients caregiver.  The patient will be discharged for further monitoring and management with their PCP.  Red flags, warning signs, worsening symptoms, and when to return to the ER discussed with and understood by the patients caregiver.  Patient will follow up with their PCP in a timely manner.  Vitals stable at discharge.  [SM]   2112 This care is provided during an unprecedented national emergency due to the Novel Coronavirus (COVID-19). COVID-19 infections and transmission risks place heavy strains on healthcare resources. As this pandemic evolves, the  Hospital and providers strive to respond fluidly, to remain operational, and to provide care relative to available resources and information. Outcomes are unpredictable and treatments are without well-defined guidelines. Further, the impact of COVID-19 on all aspects of emergency care, including the impact to patients seeking care for reasons other than COVID-19, is unavoidable during this national emergency.    This note was dictated using a [id number] tool. Occasional wrong-word or 'sound-a-like' substitutions may have occurred due to the inherent limitations of voice recognition software.  Read the chart carefully and recognize, using context, where substitutions have occurred.  [SM]      ED Course User Index  [SM] Hiwot Stone, APRN                                                 [facility]    Final diagnoses:   Seizure (HCC)       ED Disposition  ED Disposition     ED Disposition   Discharge    Condition   Stable    Comment   --             Alonso Mendez MD  [address] [place] [id number]  [phone]    Schedule an appointment as soon as possible for a visit in 2 days           Medication List      No changes were made to your prescriptions during this visit.          Hiwot Stone, JONNA  06/17/22 6079

## 2024-03-15 ENCOUNTER — HOSPITAL ENCOUNTER (EMERGENCY)
Facility: HOSPITAL | Age: 59
Discharge: HOME | End: 2024-03-16
Attending: STUDENT IN AN ORGANIZED HEALTH CARE EDUCATION/TRAINING PROGRAM
Payer: MEDICAID

## 2024-03-15 ENCOUNTER — APPOINTMENT (OUTPATIENT)
Dept: GENERAL RADIOLOGY | Facility: HOSPITAL | Age: 59
End: 2024-03-15
Payer: MEDICAID

## 2024-03-15 DIAGNOSIS — R09.89 PULMONARY VASCULAR CONGESTION: ICD-10-CM

## 2024-03-15 DIAGNOSIS — R07.89 CHEST WALL PAIN: Primary | ICD-10-CM

## 2024-03-15 LAB
ALBUMIN SERPL-MCNC: 4.1 G/DL (ref 3.5–5.2)
ALBUMIN/GLOB SERPL: 1.2 G/DL
ALP SERPL-CCNC: 197 U/L (ref 39–117)
ALT SERPL W P-5'-P-CCNC: 20 U/L (ref 1–33)
ANION GAP SERPL CALCULATED.3IONS-SCNC: 12.6 MMOL/L (ref 5–15)
AST SERPL-CCNC: 15 U/L (ref 1–32)
BASOPHILS # BLD AUTO: 0.07 10*3/MM3 (ref 0–0.2)
BASOPHILS NFR BLD AUTO: 1.1 % (ref 0–1.5)
BILIRUB SERPL-MCNC: 0.2 MG/DL (ref 0–1.2)
BUN SERPL-MCNC: 16 MG/DL (ref 6–20)
BUN/CREAT SERPL: 27.6 (ref 7–25)
CALCIUM SPEC-SCNC: 9.2 MG/DL (ref 8.6–10.5)
CHLORIDE SERPL-SCNC: 106 MMOL/L (ref 98–107)
CO2 SERPL-SCNC: 25.4 MMOL/L (ref 22–29)
CREAT SERPL-MCNC: 0.58 MG/DL (ref 0.57–1)
DEPRECATED RDW RBC AUTO: 45.4 FL (ref 37–54)
EGFRCR SERPLBLD CKD-EPI 2021: 105 ML/MIN/1.73
EOSINOPHIL # BLD AUTO: 0.31 10*3/MM3 (ref 0–0.4)
EOSINOPHIL NFR BLD AUTO: 4.7 % (ref 0.3–6.2)
ERYTHROCYTE [DISTWIDTH] IN BLOOD BY AUTOMATED COUNT: 12.8 % (ref 12.3–15.4)
GEN 5 2HR TROPONIN T REFLEX: <6 NG/L
GLOBULIN UR ELPH-MCNC: 3.5 GM/DL
GLUCOSE SERPL-MCNC: 182 MG/DL (ref 65–99)
HCT VFR BLD AUTO: 42.4 % (ref 34–46.6)
HGB BLD-MCNC: 14.3 G/DL (ref 12–15.9)
HOLD SPECIMEN: NORMAL
HOLD SPECIMEN: NORMAL
IMM GRANULOCYTES # BLD AUTO: 0.06 10*3/MM3 (ref 0–0.05)
IMM GRANULOCYTES NFR BLD AUTO: 0.9 % (ref 0–0.5)
LYMPHOCYTES # BLD AUTO: 2.32 10*3/MM3 (ref 0.7–3.1)
LYMPHOCYTES NFR BLD AUTO: 35.4 % (ref 19.6–45.3)
MCH RBC QN AUTO: 32.9 PG (ref 26.6–33)
MCHC RBC AUTO-ENTMCNC: 33.7 G/DL (ref 31.5–35.7)
MCV RBC AUTO: 97.5 FL (ref 79–97)
MONOCYTES # BLD AUTO: 0.4 10*3/MM3 (ref 0.1–0.9)
MONOCYTES NFR BLD AUTO: 6.1 % (ref 5–12)
NEUTROPHILS NFR BLD AUTO: 3.4 10*3/MM3 (ref 1.7–7)
NEUTROPHILS NFR BLD AUTO: 51.8 % (ref 42.7–76)
NRBC BLD AUTO-RTO: 0 /100 WBC (ref 0–0.2)
PLATELET # BLD AUTO: 228 10*3/MM3 (ref 140–450)
PMV BLD AUTO: 10.7 FL (ref 6–12)
POTASSIUM SERPL-SCNC: 3.8 MMOL/L (ref 3.5–5.2)
PROT SERPL-MCNC: 7.6 G/DL (ref 6–8.5)
RBC # BLD AUTO: 4.35 10*6/MM3 (ref 3.77–5.28)
SODIUM SERPL-SCNC: 144 MMOL/L (ref 136–145)
TROPONIN T DELTA: NORMAL
TROPONIN T SERPL HS-MCNC: <6 NG/L
WBC NRBC COR # BLD AUTO: 6.56 10*3/MM3 (ref 3.4–10.8)
WHOLE BLOOD HOLD COAG: NORMAL
WHOLE BLOOD HOLD SPECIMEN: NORMAL

## 2024-03-15 PROCEDURE — 71045 X-RAY EXAM CHEST 1 VIEW: CPT

## 2024-03-15 PROCEDURE — 84484 ASSAY OF TROPONIN QUANT: CPT | Performed by: STUDENT IN AN ORGANIZED HEALTH CARE EDUCATION/TRAINING PROGRAM

## 2024-03-15 PROCEDURE — 99284 EMERGENCY DEPT VISIT MOD MDM: CPT

## 2024-03-15 PROCEDURE — 71045 X-RAY EXAM CHEST 1 VIEW: CPT | Performed by: RADIOLOGY

## 2024-03-15 PROCEDURE — 85025 COMPLETE CBC W/AUTO DIFF WBC: CPT | Performed by: STUDENT IN AN ORGANIZED HEALTH CARE EDUCATION/TRAINING PROGRAM

## 2024-03-15 PROCEDURE — 80053 COMPREHEN METABOLIC PANEL: CPT | Performed by: STUDENT IN AN ORGANIZED HEALTH CARE EDUCATION/TRAINING PROGRAM

## 2024-03-15 PROCEDURE — 36415 COLL VENOUS BLD VENIPUNCTURE: CPT

## 2024-03-15 PROCEDURE — 93005 ELECTROCARDIOGRAM TRACING: CPT | Performed by: STUDENT IN AN ORGANIZED HEALTH CARE EDUCATION/TRAINING PROGRAM

## 2024-03-15 RX ORDER — SODIUM CHLORIDE 0.9 % (FLUSH) 0.9 %
10 SYRINGE (ML) INJECTION AS NEEDED
Status: DISCONTINUED | OUTPATIENT
Start: 2024-03-15 | End: 2024-03-16 | Stop reason: HOSPADM

## 2024-03-15 RX ORDER — ASPIRIN 81 MG/1
324 TABLET, CHEWABLE ORAL ONCE
Status: COMPLETED | OUTPATIENT
Start: 2024-03-15 | End: 2024-03-15

## 2024-03-15 RX ADMIN — ASPIRIN 324 MG: 81 TABLET, CHEWABLE ORAL at 17:23

## 2024-03-15 NOTE — ED PROVIDER NOTES
"Subjective   History of Present Illness  57 y/o female who is a long-term resident at [facility] with history of COPD, coronary artery disease, CVA and intellectual disability that reportedly was sent to the ER due to staff at the nursing facility claiming the patient was complaining of anterior chest wall pain.   On arrival, patient is awake and appears to be in no acute distress      Review of Systems    Past Medical History:   Diagnosis Date    COPD (chronic obstructive pulmonary disease)     History of CVA (cerebrovascular accident) 8/17/2020    History of seizures 8/17/2020    Intellectual disability 8/17/2020       No Known Allergies    No past surgical history on file.    No family history on file.    Social History     Socioeconomic History    Marital status: Single   Tobacco Use    Smoking status: Never    Smokeless tobacco: Never           Objective   Physical Exam  Vitals and nursing note reviewed.   Constitutional:       General: She is not in acute distress.     Appearance: She is well-developed. She is not diaphoretic.      Comments: Patient has significant intellectual disability.  She is comfortably sleeping in a bed in no acute distress vitals are stable.  Patient is easily aroused.   HENT:      Head: Normocephalic and atraumatic.      Right Ear: External ear normal.      Left Ear: External ear normal.      Nose: Nose normal.   Eyes:      Conjunctiva/sclera: Conjunctivae normal.      Pupils: Pupils are equal, round, and reactive to light.   Neck:      Vascular: No JVD.      Trachea: No tracheal deviation.   Cardiovascular:      Rate and Rhythm: Normal rate and regular rhythm.      Heart sounds: Normal heart sounds. No murmur heard.     Comments: Patient complains of pain when pushing on the costochondral junctions along the sternum; saying \"ouch, that hurts.\"  Pulmonary:      Effort: Pulmonary effort is normal. No respiratory distress.      Breath sounds: No wheezing.      Comments: " Very faint intermittent crackles in the lung bases bilaterally.  Abdominal:      General: Bowel sounds are normal.      Palpations: Abdomen is soft.      Tenderness: There is no abdominal tenderness.   Musculoskeletal:         General: No deformity. Normal range of motion.      Cervical back: Normal range of motion and neck supple.   Skin:     General: Skin is warm and dry.      Coloration: Skin is not pale.      Findings: No erythema or rash.   Neurological:      Mental Status: She is alert.      Cranial Nerves: No cranial nerve deficit.   Psychiatric:         Thought Content: Thought content normal.      Comments: Significant intellectual disability with simple thought processing and gross cognitive delay.  Patient's responses do not always accurately reflect questioning.           Procedures       Results for orders placed or performed during the hospital encounter of 03/15/24   High Sensitivity Troponin T    Specimen: Arm, Left; Blood   Result Value Ref Range    HS Troponin T <6 <14 ng/L   Comprehensive Metabolic Panel    Specimen: Arm, Left; Blood   Result Value Ref Range    Glucose 182 (H) 65 - 99 mg/dL    BUN 16 6 - 20 mg/dL    Creatinine 0.58 0.57 - 1.00 mg/dL    Sodium 144 136 - 145 mmol/L    Potassium 3.8 3.5 - 5.2 mmol/L    Chloride 106 98 - 107 mmol/L    CO2 25.4 22.0 - 29.0 mmol/L    Calcium 9.2 8.6 - 10.5 mg/dL    Total Protein 7.6 6.0 - 8.5 g/dL    Albumin 4.1 3.5 - 5.2 g/dL    ALT (SGPT) 20 1 - 33 U/L    AST (SGOT) 15 1 - 32 U/L    Alkaline Phosphatase 197 (H) 39 - 117 U/L    Total Bilirubin 0.2 0.0 - 1.2 mg/dL    Globulin 3.5 gm/dL    A/G Ratio 1.2 g/dL    BUN/Creatinine Ratio 27.6 (H) 7.0 - 25.0    Anion Gap 12.6 5.0 - 15.0 mmol/L    eGFR 105.0 >60.0 mL/min/1.73   CBC Auto Differential    Specimen: Arm, Left; Blood   Result Value Ref Range    WBC 6.56 3.40 - 10.80 10*3/mm3    RBC 4.35 3.77 - 5.28 10*6/mm3    Hemoglobin 14.3 12.0 - 15.9 g/dL    Hematocrit 42.4 34.0 - 46.6 %    MCV 97.5 (H) 79.0 -  97.0 fL    MCH 32.9 26.6 - 33.0 pg    MCHC 33.7 31.5 - 35.7 g/dL    RDW 12.8 12.3 - 15.4 %    RDW-SD 45.4 37.0 - 54.0 fl    MPV 10.7 6.0 - 12.0 fL    Platelets 228 140 - 450 10*3/mm3    Neutrophil % 51.8 42.7 - 76.0 %    Lymphocyte % 35.4 19.6 - 45.3 %    Monocyte % 6.1 5.0 - 12.0 %    Eosinophil % 4.7 0.3 - 6.2 %    Basophil % 1.1 0.0 - 1.5 %    Immature Grans % 0.9 (H) 0.0 - 0.5 %    Neutrophils, Absolute 3.40 1.70 - 7.00 10*3/mm3    Lymphocytes, Absolute 2.32 0.70 - 3.10 10*3/mm3    Monocytes, Absolute 0.40 0.10 - 0.90 10*3/mm3    Eosinophils, Absolute 0.31 0.00 - 0.40 10*3/mm3    Basophils, Absolute 0.07 0.00 - 0.20 10*3/mm3    Immature Grans, Absolute 0.06 (H) 0.00 - 0.05 10*3/mm3    nRBC 0.0 0.0 - 0.2 /100 WBC   High Sensitivity Troponin T 2Hr    Specimen: Blood   Result Value Ref Range    HS Troponin T <6 <14 ng/L    Troponin T Delta     ECG 12 Lead Chest Pain   Result Value Ref Range    QT Interval 400 ms    QTC Interval 444 ms   Green Top (Gel)   Result Value Ref Range    Extra Tube Hold for add-ons.    Lavender Top   Result Value Ref Range    Extra Tube hold for add-on    Gold Top - SST   Result Value Ref Range    Extra Tube Hold for add-ons.    Light Blue Top   Result Value Ref Range    Extra Tube Hold for add-ons.        XR Chest 1 View   Final Result   Appearance suggestive of CHF, but portable exam               This report was finalized on 3/15/2024 5:53 PM by Dr. Duc Arechiga MD.                ED Course  ED Course as of 03/15/24 2322   Fri Mar 15, 2024   2109 Patient had negative troponins x 2.  No acute life-threatening abnormalities were identified on laboratory workup today.    EKG showed old lateral infarct but no acute ST elevation or deviations with sinus rhythm rate 74.bpm   [LK]   2110 Patient has very limited radiographic imaging for comparison but chest x-ray appears to have mild pulmonary vascular congestion without focal consolidations.  When compared to an x-ray in August  2020.  Patient shows no evidence of respiratory distress and she is 95% on room air.  Patient does not appear vascular volume overload or need for acute diuresing therapy.    Patient ultimately appears to be at her chronic baseline medical conditions and is stable to be discharged back to the nursing home with instruction to continue all prescribed chronic medications and follow-up with all routine established providers who manage her chronic medical conditions. [LK]   2124 Patient does have intellectual disability and reliability of patient answering questions is taken into consideration but patient does push on her chest and says she has chest pain but appears to be in absolutely no acute distress or significant pain resting in bed.  Patient was actually sleeping in bed when I initially walked into the room.  Patient on the anterior chest wall she is slightly tender in the costochondral region but could also have some pleurisy given pulmonary vascular congestion seen on chest x-ray.  Recommend patient at least follow-up with a cardiologist for long-term medical management of cardiac medications as patient may progressively need to be on diuretic therapy to prevent any type of respiratory failure in the future from hea [LK]   3763 Reviewed patient's medications so that she is on metoprolol as well as Imdur and with pulmonary vascular congestion seen on chest x-ray question patient's cardiac function.  Patient shows no acute need for diuretic therapy but was recommended to the nursing home staff to follow-up with cardiologist to have recent comprehensive cardiac workup for medication optimization. [LK]      ED Course User Index  [LK] Jamilah Bunch DO                HEART Score: 2                              Medical Decision Making  Problems Addressed:  Pulmonary vascular congestion: acute illness or injury        Final diagnoses:   Pulmonary vascular congestion   Chest wall pain       ED Disposition  ED  Disposition       ED Disposition   Discharge    Condition   Stable    Comment   --               Adams Correia MD  [address] [place] [address]  [phone]    Schedule an appointment as soon as possible for a visit            Medication List      No changes were made to your prescriptions during this visit.            Jamilah Bunch DO  03/15/24 0209

## 2024-03-16 VITALS
BODY MASS INDEX: 31.01 KG/M2 | WEIGHT: 175 LBS | RESPIRATION RATE: 18 BRPM | HEIGHT: 63 IN | DIASTOLIC BLOOD PRESSURE: 90 MMHG | OXYGEN SATURATION: 98 % | HEART RATE: 71 BPM | SYSTOLIC BLOOD PRESSURE: 115 MMHG | TEMPERATURE: 98.7 F

## 2024-03-16 LAB
QT INTERVAL: 400 MS
QTC INTERVAL: 444 MS

## 2024-03-16 NOTE — ED NOTES
Pt bedding changed, pt changed out of pants, tawana placed under pt, brief placed on pt, pure wick in place. Pt adjusted in bed, pt call light in reach.

## 2024-03-16 NOTE — DISCHARGE INSTRUCTIONS
Patient had mild pulmonary vascular congestion seen on chest x-ray.    Reviewed patient's medication shows pt likely has underlying cardiac disease  so that she is on metoprolol as well as Imdur and with pulmonary vascular congestion seen on chest x-ray     Recommend patient follow-up with cardiac physician just to have a chronic checkup for medication optimization.      Patient shows no acute need for diuretic therapy today but based on interstitial pulmonary congestion patient may require diuretic therapy in the future.     Patient's chest wall was also tenderness with deep palpation but patient appeared to be in no significant or severe pain.  Recommending just utilizing Motrin or Tylenol as needed

## 2024-03-16 NOTE — ED NOTES
[facility] contacted for transport back too nursing facility, previously aware that  only has two active crews in [facility] but will contact [facility] to see if transport during the night will be possible, lead RN aware.

## 2024-08-02 ENCOUNTER — APPOINTMENT (OUTPATIENT)
Dept: CT IMAGING | Facility: HOSPITAL | Age: 59
End: 2024-08-02
Payer: MEDICAID

## 2024-08-02 ENCOUNTER — HOSPITAL ENCOUNTER (EMERGENCY)
Facility: HOSPITAL | Age: 59
Discharge: HOME | End: 2024-08-02
Attending: STUDENT IN AN ORGANIZED HEALTH CARE EDUCATION/TRAINING PROGRAM
Payer: MEDICAID

## 2024-08-02 ENCOUNTER — APPOINTMENT (OUTPATIENT)
Dept: GENERAL RADIOLOGY | Facility: HOSPITAL | Age: 59
End: 2024-08-02
Payer: MEDICAID

## 2024-08-02 VITALS
TEMPERATURE: 97.8 F | RESPIRATION RATE: 18 BRPM | SYSTOLIC BLOOD PRESSURE: 124 MMHG | DIASTOLIC BLOOD PRESSURE: 80 MMHG | OXYGEN SATURATION: 100 % | HEART RATE: 74 BPM

## 2024-08-02 DIAGNOSIS — G40.909 SEIZURE DISORDER: Primary | ICD-10-CM

## 2024-08-02 LAB
ALBUMIN SERPL-MCNC: 4.1 G/DL (ref 3.5–5.2)
ALBUMIN/GLOB SERPL: 1.1 G/DL
ALP SERPL-CCNC: 163 U/L (ref 39–117)
ALT SERPL W P-5'-P-CCNC: 23 U/L (ref 1–33)
AMPHET+METHAMPHET UR QL: NEGATIVE
AMPHETAMINES UR QL: NEGATIVE
ANION GAP SERPL CALCULATED.3IONS-SCNC: 13.8 MMOL/L (ref 5–15)
AST SERPL-CCNC: 15 U/L (ref 1–32)
BACTERIA UR QL AUTO: ABNORMAL /HPF
BARBITURATES UR QL SCN: POSITIVE
BASOPHILS # BLD AUTO: 0.09 10*3/MM3 (ref 0–0.2)
BASOPHILS NFR BLD AUTO: 0.8 % (ref 0–1.5)
BENZODIAZ UR QL SCN: POSITIVE
BILIRUB SERPL-MCNC: 0.3 MG/DL (ref 0–1.2)
BILIRUB UR QL STRIP: NEGATIVE
BUN SERPL-MCNC: 10 MG/DL (ref 6–20)
BUN/CREAT SERPL: 17.2 (ref 7–25)
BUPRENORPHINE SERPL-MCNC: NEGATIVE NG/ML
CALCIUM SPEC-SCNC: 9.4 MG/DL (ref 8.6–10.5)
CANNABINOIDS SERPL QL: NEGATIVE
CHLORIDE SERPL-SCNC: 107 MMOL/L (ref 98–107)
CLARITY UR: ABNORMAL
CO2 SERPL-SCNC: 21.2 MMOL/L (ref 22–29)
COCAINE UR QL: NEGATIVE
COLOR UR: ABNORMAL
CREAT SERPL-MCNC: 0.58 MG/DL (ref 0.57–1)
DEPRECATED RDW RBC AUTO: 44 FL (ref 37–54)
EGFRCR SERPLBLD CKD-EPI 2021: 104.4 ML/MIN/1.73
EOSINOPHIL # BLD AUTO: 0.4 10*3/MM3 (ref 0–0.4)
EOSINOPHIL NFR BLD AUTO: 3.5 % (ref 0.3–6.2)
ERYTHROCYTE [DISTWIDTH] IN BLOOD BY AUTOMATED COUNT: 12.8 % (ref 12.3–15.4)
FENTANYL UR-MCNC: NEGATIVE NG/ML
GLOBULIN UR ELPH-MCNC: 3.6 GM/DL
GLUCOSE BLDC GLUCOMTR-MCNC: 183 MG/DL (ref 70–130)
GLUCOSE SERPL-MCNC: 193 MG/DL (ref 65–99)
GLUCOSE UR STRIP-MCNC: NEGATIVE MG/DL
HCT VFR BLD AUTO: 41.2 % (ref 34–46.6)
HGB BLD-MCNC: 14.3 G/DL (ref 12–15.9)
HGB UR QL STRIP.AUTO: NEGATIVE
HOLD SPECIMEN: NORMAL
HYALINE CASTS UR QL AUTO: ABNORMAL /LPF
IMM GRANULOCYTES # BLD AUTO: 0.08 10*3/MM3 (ref 0–0.05)
IMM GRANULOCYTES NFR BLD AUTO: 0.7 % (ref 0–0.5)
KETONES UR QL STRIP: ABNORMAL
LEUKOCYTE ESTERASE UR QL STRIP.AUTO: ABNORMAL
LYMPHOCYTES # BLD AUTO: 2.57 10*3/MM3 (ref 0.7–3.1)
LYMPHOCYTES NFR BLD AUTO: 22.3 % (ref 19.6–45.3)
MCH RBC QN AUTO: 32.6 PG (ref 26.6–33)
MCHC RBC AUTO-ENTMCNC: 34.7 G/DL (ref 31.5–35.7)
MCV RBC AUTO: 94.1 FL (ref 79–97)
METHADONE UR QL SCN: NEGATIVE
MONOCYTES # BLD AUTO: 0.76 10*3/MM3 (ref 0.1–0.9)
MONOCYTES NFR BLD AUTO: 6.6 % (ref 5–12)
NEUTROPHILS NFR BLD AUTO: 66.1 % (ref 42.7–76)
NEUTROPHILS NFR BLD AUTO: 7.6 10*3/MM3 (ref 1.7–7)
NITRITE UR QL STRIP: POSITIVE
NRBC BLD AUTO-RTO: 0 /100 WBC (ref 0–0.2)
OPIATES UR QL: NEGATIVE
OXYCODONE UR QL SCN: NEGATIVE
PCP UR QL SCN: NEGATIVE
PH UR STRIP.AUTO: 5.5 [PH] (ref 5–8)
PHENYTOIN SERPL-MCNC: 22.2 MCG/ML (ref 10–20)
PLATELET # BLD AUTO: 237 10*3/MM3 (ref 140–450)
PMV BLD AUTO: 10.5 FL (ref 6–12)
POTASSIUM SERPL-SCNC: 3.7 MMOL/L (ref 3.5–5.2)
PROT SERPL-MCNC: 7.7 G/DL (ref 6–8.5)
PROT UR QL STRIP: ABNORMAL
RBC # BLD AUTO: 4.38 10*6/MM3 (ref 3.77–5.28)
RBC # UR STRIP: ABNORMAL /HPF
REF LAB TEST METHOD: ABNORMAL
SODIUM SERPL-SCNC: 142 MMOL/L (ref 136–145)
SP GR UR STRIP: 1.02 (ref 1–1.03)
SQUAMOUS #/AREA URNS HPF: ABNORMAL /HPF
TRICYCLICS UR QL SCN: POSITIVE
UROBILINOGEN UR QL STRIP: ABNORMAL
WBC # UR STRIP: ABNORMAL /HPF
WBC NRBC COR # BLD AUTO: 11.5 10*3/MM3 (ref 3.4–10.8)
WHOLE BLOOD HOLD COAG: NORMAL
WHOLE BLOOD HOLD SPECIMEN: NORMAL

## 2024-08-02 PROCEDURE — 70450 CT HEAD/BRAIN W/O DYE: CPT | Performed by: RADIOLOGY

## 2024-08-02 PROCEDURE — 99284 EMERGENCY DEPT VISIT MOD MDM: CPT

## 2024-08-02 PROCEDURE — 25010000002 LEVETIRACETAM IN NACL 0.54% 1500 MG/100ML SOLUTION: Performed by: STUDENT IN AN ORGANIZED HEALTH CARE EDUCATION/TRAINING PROGRAM

## 2024-08-02 PROCEDURE — 80053 COMPREHEN METABOLIC PANEL: CPT | Performed by: STUDENT IN AN ORGANIZED HEALTH CARE EDUCATION/TRAINING PROGRAM

## 2024-08-02 PROCEDURE — 85025 COMPLETE CBC W/AUTO DIFF WBC: CPT | Performed by: STUDENT IN AN ORGANIZED HEALTH CARE EDUCATION/TRAINING PROGRAM

## 2024-08-02 PROCEDURE — P9612 CATHETERIZE FOR URINE SPEC: HCPCS

## 2024-08-02 PROCEDURE — 70450 CT HEAD/BRAIN W/O DYE: CPT

## 2024-08-02 PROCEDURE — 96374 THER/PROPH/DIAG INJ IV PUSH: CPT

## 2024-08-02 PROCEDURE — 71045 X-RAY EXAM CHEST 1 VIEW: CPT | Performed by: RADIOLOGY

## 2024-08-02 PROCEDURE — 81001 URINALYSIS AUTO W/SCOPE: CPT | Performed by: STUDENT IN AN ORGANIZED HEALTH CARE EDUCATION/TRAINING PROGRAM

## 2024-08-02 PROCEDURE — 80185 ASSAY OF PHENYTOIN TOTAL: CPT | Performed by: STUDENT IN AN ORGANIZED HEALTH CARE EDUCATION/TRAINING PROGRAM

## 2024-08-02 PROCEDURE — 71045 X-RAY EXAM CHEST 1 VIEW: CPT

## 2024-08-02 PROCEDURE — 82948 REAGENT STRIP/BLOOD GLUCOSE: CPT

## 2024-08-02 PROCEDURE — 80307 DRUG TEST PRSMV CHEM ANLYZR: CPT | Performed by: STUDENT IN AN ORGANIZED HEALTH CARE EDUCATION/TRAINING PROGRAM

## 2024-08-02 RX ORDER — LEVETIRACETAM 15 MG/ML
1500 INJECTION INTRAVASCULAR ONCE
Status: COMPLETED | OUTPATIENT
Start: 2024-08-02 | End: 2024-08-02

## 2024-08-02 RX ORDER — SODIUM CHLORIDE 0.9 % (FLUSH) 0.9 %
10 SYRINGE (ML) INJECTION AS NEEDED
Status: DISCONTINUED | OUTPATIENT
Start: 2024-08-02 | End: 2024-08-02 | Stop reason: HOSPADM

## 2024-08-02 RX ADMIN — LEVETIRACETAM INJECTION 1500 MG: 15 INJECTION INTRAVENOUS at 06:22

## 2024-08-02 NOTE — ED NOTES
Attempted to call report to [facility] x2 at this time. Both times were transferred to another number that went straight to voicemail. Report given to S at bedside.

## 2024-08-02 NOTE — ED PROVIDER NOTES
Subjective   History of Present Illness  59-year-old female with past medical history of seizures, ID, and hypertension presents to the ER due to concerns for breakthrough seizures at the local nursing home.  Patient received diazepam IM from the nursing home staff due to the seizure episode lasting approximately 5 minutes.  When the patient arrived, she was awake and alert.  Patient cannot answer questions clearly due to considerable hearing loss.  No obvious focal neurological deficits.  Patient is moving all extremities without complication.  1 seizure lasting a very short duration was noted shortly after patient's arrival.  No Ativan required.  Vitals stable.  Afebrile      Review of Systems   Unable to perform ROS: Other   Neurological:  Positive for seizures.       Past Medical History:   Diagnosis Date    COPD (chronic obstructive pulmonary disease)     History of CVA (cerebrovascular accident) 8/17/2020    History of seizures 8/17/2020    Intellectual disability 8/17/2020       No Known Allergies    No past surgical history on file.    No family history on file.    Social History     Socioeconomic History    Marital status: Single   Tobacco Use    Smoking status: Never    Smokeless tobacco: Never           Objective   Physical Exam  Constitutional:       General: She is not in acute distress.     Appearance: Normal appearance. She is not ill-appearing.   HENT:      Head: Normocephalic and atraumatic.      Right Ear: External ear normal.      Left Ear: External ear normal.      Nose: Nose normal.      Mouth/Throat:      Mouth: Mucous membranes are moist.   Eyes:      Extraocular Movements: Extraocular movements intact.      Pupils: Pupils are equal, round, and reactive to light.   Cardiovascular:      Rate and Rhythm: Normal rate and regular rhythm.      Heart sounds: No murmur heard.  Pulmonary:      Effort: Pulmonary effort is normal. No respiratory distress.      Breath sounds: Normal breath sounds. No  wheezing.   Abdominal:      General: Bowel sounds are normal.      Palpations: Abdomen is soft.      Tenderness: There is no abdominal tenderness.   Musculoskeletal:         General: No deformity or signs of injury. Normal range of motion.      Cervical back: Normal range of motion and neck supple.   Skin:     General: Skin is warm and dry.      Findings: No erythema.   Neurological:      General: No focal deficit present.      Mental Status: She is alert. She is disoriented and confused.      Cranial Nerves: No cranial nerve deficit.      Sensory: Sensation is intact.      Motor: Motor function is intact.      Coordination: Coordination is intact.   Psychiatric:         Mood and Affect: Mood normal.         Behavior: Behavior normal.         Thought Content: Thought content normal.         Procedures  Results for orders placed or performed during the hospital encounter of 08/02/24   Comprehensive Metabolic Panel    Specimen: Arm, Left; Blood   Result Value Ref Range    Glucose 193 (H) 65 - 99 mg/dL    BUN 10 6 - 20 mg/dL    Creatinine 0.58 0.57 - 1.00 mg/dL    Sodium 142 136 - 145 mmol/L    Potassium 3.7 3.5 - 5.2 mmol/L    Chloride 107 98 - 107 mmol/L    CO2 21.2 (L) 22.0 - 29.0 mmol/L    Calcium 9.4 8.6 - 10.5 mg/dL    Total Protein 7.7 6.0 - 8.5 g/dL    Albumin 4.1 3.5 - 5.2 g/dL    ALT (SGPT) 23 1 - 33 U/L    AST (SGOT) 15 1 - 32 U/L    Alkaline Phosphatase 163 (H) 39 - 117 U/L    Total Bilirubin 0.3 0.0 - 1.2 mg/dL    Globulin 3.6 gm/dL    A/G Ratio 1.1 g/dL    BUN/Creatinine Ratio 17.2 7.0 - 25.0    Anion Gap 13.8 5.0 - 15.0 mmol/L    eGFR 104.4 >60.0 mL/min/1.73   Urinalysis With Microscopic If Indicated (No Culture) - Straight Cath    Specimen: Straight Cath; Urine   Result Value Ref Range    Color, UA Dark Yellow (A) Yellow, Straw    Appearance, UA Cloudy (A) Clear    pH, UA 5.5 5.0 - 8.0    Specific Gravity, UA 1.023 1.005 - 1.030    Glucose, UA Negative Negative    Ketones, UA Trace (A) Negative     Bilirubin, UA Negative Negative    Blood, UA Negative Negative    Protein, UA Trace (A) Negative    Leuk Esterase, UA Moderate (2+) (A) Negative    Nitrite, UA Positive (A) Negative    Urobilinogen, UA 1.0 E.U./dL 0.2 - 1.0 E.U./dL   Urine Drug Screen - Straight Cath    Specimen: Straight Cath; Urine   Result Value Ref Range    THC, Screen, Urine Negative Negative    Phencyclidine (PCP), Urine Negative Negative    Cocaine Screen, Urine Negative Negative    Methamphetamine, Ur Negative Negative    Opiate Screen Negative Negative    Amphetamine Screen, Urine Negative Negative    Benzodiazepine Screen, Urine Positive (A) Negative    Tricyclic Antidepressants Screen Positive (A) Negative    Methadone Screen, Urine Negative Negative    Barbiturates Screen, Urine Positive (A) Negative    Oxycodone Screen, Urine Negative Negative    Buprenorphine, Screen, Urine Negative Negative   Phenytoin Level, Total    Specimen: Arm, Left; Blood   Result Value Ref Range    Phenytoin Level 22.2 (C) 10.0 - 20.0 mcg/mL   CBC Auto Differential    Specimen: Arm, Left; Blood   Result Value Ref Range    WBC 11.50 (H) 3.40 - 10.80 10*3/mm3    RBC 4.38 3.77 - 5.28 10*6/mm3    Hemoglobin 14.3 12.0 - 15.9 g/dL    Hematocrit 41.2 34.0 - 46.6 %    MCV 94.1 79.0 - 97.0 fL    MCH 32.6 26.6 - 33.0 pg    MCHC 34.7 31.5 - 35.7 g/dL    RDW 12.8 12.3 - 15.4 %    RDW-SD 44.0 37.0 - 54.0 fl    MPV 10.5 6.0 - 12.0 fL    Platelets 237 140 - 450 10*3/mm3    Neutrophil % 66.1 42.7 - 76.0 %    Lymphocyte % 22.3 19.6 - 45.3 %    Monocyte % 6.6 5.0 - 12.0 %    Eosinophil % 3.5 0.3 - 6.2 %    Basophil % 0.8 0.0 - 1.5 %    Immature Grans % 0.7 (H) 0.0 - 0.5 %    Neutrophils, Absolute 7.60 (H) 1.70 - 7.00 10*3/mm3    Lymphocytes, Absolute 2.57 0.70 - 3.10 10*3/mm3    Monocytes, Absolute 0.76 0.10 - 0.90 10*3/mm3    Eosinophils, Absolute 0.40 0.00 - 0.40 10*3/mm3    Basophils, Absolute 0.09 0.00 - 0.20 10*3/mm3    Immature Grans, Absolute 0.08 (H) 0.00 - 0.05 10*3/mm3  Yes    nRBC 0.0 0.0 - 0.2 /100 WBC   Fentanyl, Urine - Straight Cath    Specimen: Straight Cath; Urine   Result Value Ref Range    Fentanyl, Urine Negative Negative   Urinalysis, Microscopic Only - Straight Cath    Specimen: Straight Cath; Urine   Result Value Ref Range    RBC, UA 3-5 (A) None Seen, 0-2 /HPF    WBC, UA Too Numerous to Count (A) None Seen, 0-2 /HPF    Bacteria, UA 4+ (A) None Seen /HPF    Squamous Epithelial Cells, UA 13-20 (A) None Seen, 0-2 /HPF    Hyaline Casts, UA None Seen None Seen /LPF    Methodology Manual Light Microscopy    [place] Urine Culture Tube - Straight Cath    Specimen: Straight Cath; Urine   Result Value Ref Range    Extra Tube Hold for add-ons.    POC Glucose Once    Specimen: Blood   Result Value Ref Range    Glucose 183 (H) 70 - 130 mg/dL   Green Top (Gel)   Result Value Ref Range    Extra Tube Hold for add-ons.    Lavender Top   Result Value Ref Range    Extra Tube hold for add-on    Gold Top - SST   Result Value Ref Range    Extra Tube Hold for add-ons.    Light Blue Top   Result Value Ref Range    Extra Tube Hold for add-ons.      XR Chest 1 View, CT Head Without Contrast    Result Date: 8/2/2024  Narrative: VERIFICATION OBSERVER NAME: Shiv Valerio MD.  Technique: Axial images were obtained along with coronal and sagittal reconstruction. DLP in mGycm reported in the EMR records. Dose lowering technique: Automated exposure control with adjustment of the MA and/or KV, use of iterative reconstruction. Data included in the medical records.  HISTORY/COMPARISON/FINDINGS:  Comparison: None.  HISTORY: Acute mental status change  Findings:  Moderate to severe generalized atrophy.  Large old infarct involving almost the entire LEFT MCA territory. No intracranial hemorrhage, mass or subdural collections. No shift of midline structures. Sinuses are clear.      Impression: No acute intracranial process. Large left-sided MCA infarct.    VERIFICATION OBSERVER NAME: Shiv Valerio MD.  TECHNIQUE,  HISTORY, FINDINGS:  Comparison: None.  History : Acute mental status change.  Findings:  Minimal cardiac enlargement. Mild pulmonary vascular congestion. No discrete consolidation. Central airways are patent. No pneumothorax or pleural effusion. No acute osseous abnormality.  IMPRESSION:  Cardiac enlargement and pulmonary vascular congestion consistent with CHF..     [id number] Zip code: [id number]           This report was finalized on 8/2/2024 7:46 AM by Dr. Shiv Valerio MD.              ED Course  ED Course as of 08/02/24 1115   Fri Aug 02, 2024   0649 Care of this patient was transferred to the next attending physician at shift change.  Complete discussion of presentation, labs, imaging, care, and expected course occurred during transition of providers.  Vitals stable at transfer of care.    Electronically signed by Geronimo Jama DO, 08/02/24, 6:49 AM EDT.   [SF]      ED Course User Index  [SF] Geronimo Jama DO                                             Medical Decision Making  Problems Addressed:  Seizure disorder: complicated acute illness or injury    Amount and/or Complexity of Data Reviewed  Labs: ordered.  Radiology: ordered.    Risk  Prescription drug management.        Final diagnoses:   Seizure disorder       ED Disposition  ED Disposition       ED Disposition   Discharge    Condition   Stable    Comment   --               Alonso Mendez MD  [address] [place] [id number]  [phone]    In 1 week           Medication List      No changes were made to your prescriptions during this visit.            Campbell Carter MD  08/02/24 [id number]

## 2024-08-02 NOTE — ED NOTES
Patient has had various blood pressures d/t her movement and noncompliance with wearing the blood pressure cuff correctly. When blood pressure cuff placed correctly and monitored, blood pressure is stable.

## 2025-04-27 ENCOUNTER — APPOINTMENT (OUTPATIENT)
Dept: CT IMAGING | Facility: HOSPITAL | Age: 60
End: 2025-04-27
Payer: MEDICAID

## 2025-04-27 ENCOUNTER — HOSPITAL ENCOUNTER (EMERGENCY)
Facility: HOSPITAL | Age: 60
Discharge: NURSING, REHAB OR LONG-TERM CARE | End: 2025-04-27
Payer: MEDICAID

## 2025-04-27 VITALS
WEIGHT: 178.6 LBS | HEIGHT: 63 IN | OXYGEN SATURATION: 100 % | DIASTOLIC BLOOD PRESSURE: 75 MMHG | BODY MASS INDEX: 31.64 KG/M2 | HEART RATE: 64 BPM | RESPIRATION RATE: 16 BRPM | SYSTOLIC BLOOD PRESSURE: 113 MMHG | TEMPERATURE: 97.8 F

## 2025-04-27 DIAGNOSIS — R10.84 GENERALIZED ABDOMINAL PAIN: Primary | ICD-10-CM

## 2025-04-27 LAB
ALBUMIN SERPL-MCNC: 4 G/DL (ref 3.5–5.2)
ALBUMIN/GLOB SERPL: 1.1 G/DL
ALP SERPL-CCNC: 135 U/L (ref 39–117)
ALT SERPL W P-5'-P-CCNC: 21 U/L (ref 1–33)
ANION GAP SERPL CALCULATED.3IONS-SCNC: 8.2 MMOL/L (ref 5–15)
AST SERPL-CCNC: 20 U/L (ref 1–32)
BACTERIA UR QL AUTO: ABNORMAL /HPF
BASOPHILS # BLD AUTO: 0.07 10*3/MM3 (ref 0–0.2)
BASOPHILS NFR BLD AUTO: 0.9 % (ref 0–1.5)
BILIRUB SERPL-MCNC: 0.4 MG/DL (ref 0–1.2)
BILIRUB UR QL STRIP: ABNORMAL
BUN SERPL-MCNC: 16 MG/DL (ref 8–23)
BUN/CREAT SERPL: 28.1 (ref 7–25)
CALCIUM SPEC-SCNC: 9.4 MG/DL (ref 8.6–10.5)
CHLORIDE SERPL-SCNC: 107 MMOL/L (ref 98–107)
CLARITY UR: ABNORMAL
CO2 SERPL-SCNC: 24.8 MMOL/L (ref 22–29)
COLOR UR: ABNORMAL
CREAT SERPL-MCNC: 0.57 MG/DL (ref 0.57–1)
DEPRECATED RDW RBC AUTO: 47.7 FL (ref 37–54)
EGFRCR SERPLBLD CKD-EPI 2021: 104.2 ML/MIN/1.73
EOSINOPHIL # BLD AUTO: 0.34 10*3/MM3 (ref 0–0.4)
EOSINOPHIL NFR BLD AUTO: 4.2 % (ref 0.3–6.2)
ERYTHROCYTE [DISTWIDTH] IN BLOOD BY AUTOMATED COUNT: 13 % (ref 12.3–15.4)
GLOBULIN UR ELPH-MCNC: 3.5 GM/DL
GLUCOSE SERPL-MCNC: 172 MG/DL (ref 65–99)
GLUCOSE UR STRIP-MCNC: NEGATIVE MG/DL
HCT VFR BLD AUTO: 44.6 % (ref 34–46.6)
HGB BLD-MCNC: 14.4 G/DL (ref 12–15.9)
HGB UR QL STRIP.AUTO: NEGATIVE
HYALINE CASTS UR QL AUTO: ABNORMAL /LPF
IMM GRANULOCYTES # BLD AUTO: 0.06 10*3/MM3 (ref 0–0.05)
IMM GRANULOCYTES NFR BLD AUTO: 0.7 % (ref 0–0.5)
KETONES UR QL STRIP: ABNORMAL
LEUKOCYTE ESTERASE UR QL STRIP.AUTO: ABNORMAL
LIPASE SERPL-CCNC: 20 U/L (ref 13–60)
LYMPHOCYTES # BLD AUTO: 2.36 10*3/MM3 (ref 0.7–3.1)
LYMPHOCYTES NFR BLD AUTO: 28.9 % (ref 19.6–45.3)
MCH RBC QN AUTO: 31.9 PG (ref 26.6–33)
MCHC RBC AUTO-ENTMCNC: 32.3 G/DL (ref 31.5–35.7)
MCV RBC AUTO: 98.7 FL (ref 79–97)
MONOCYTES # BLD AUTO: 0.91 10*3/MM3 (ref 0.1–0.9)
MONOCYTES NFR BLD AUTO: 11.1 % (ref 5–12)
NEUTROPHILS NFR BLD AUTO: 4.43 10*3/MM3 (ref 1.7–7)
NEUTROPHILS NFR BLD AUTO: 54.2 % (ref 42.7–76)
NITRITE UR QL STRIP: POSITIVE
NRBC BLD AUTO-RTO: 0 /100 WBC (ref 0–0.2)
PH UR STRIP.AUTO: 6 [PH] (ref 5–8)
PLATELET # BLD AUTO: 190 10*3/MM3 (ref 140–450)
PMV BLD AUTO: 10.7 FL (ref 6–12)
POTASSIUM SERPL-SCNC: 4 MMOL/L (ref 3.5–5.2)
PROT SERPL-MCNC: 7.5 G/DL (ref 6–8.5)
PROT UR QL STRIP: ABNORMAL
RBC # BLD AUTO: 4.52 10*6/MM3 (ref 3.77–5.28)
RBC # UR STRIP: ABNORMAL /HPF
REF LAB TEST METHOD: ABNORMAL
SODIUM SERPL-SCNC: 140 MMOL/L (ref 136–145)
SP GR UR STRIP: 1.02 (ref 1–1.03)
SQUAMOUS #/AREA URNS HPF: ABNORMAL /HPF
UROBILINOGEN UR QL STRIP: ABNORMAL
WBC # UR STRIP: ABNORMAL /HPF
WBC NRBC COR # BLD AUTO: 8.17 10*3/MM3 (ref 3.4–10.8)

## 2025-04-27 PROCEDURE — 87186 SC STD MICRODIL/AGAR DIL: CPT

## 2025-04-27 PROCEDURE — 96361 HYDRATE IV INFUSION ADD-ON: CPT

## 2025-04-27 PROCEDURE — 99285 EMERGENCY DEPT VISIT HI MDM: CPT

## 2025-04-27 PROCEDURE — 74177 CT ABD & PELVIS W/CONTRAST: CPT | Performed by: RADIOLOGY

## 2025-04-27 PROCEDURE — 85025 COMPLETE CBC W/AUTO DIFF WBC: CPT

## 2025-04-27 PROCEDURE — 87077 CULTURE AEROBIC IDENTIFY: CPT

## 2025-04-27 PROCEDURE — 74177 CT ABD & PELVIS W/CONTRAST: CPT

## 2025-04-27 PROCEDURE — 87088 URINE BACTERIA CULTURE: CPT

## 2025-04-27 PROCEDURE — 83690 ASSAY OF LIPASE: CPT

## 2025-04-27 PROCEDURE — 36415 COLL VENOUS BLD VENIPUNCTURE: CPT

## 2025-04-27 PROCEDURE — 80053 COMPREHEN METABOLIC PANEL: CPT

## 2025-04-27 PROCEDURE — 87086 URINE CULTURE/COLONY COUNT: CPT

## 2025-04-27 PROCEDURE — 81001 URINALYSIS AUTO W/SCOPE: CPT

## 2025-04-27 PROCEDURE — 25810000003 SODIUM CHLORIDE 0.9 % SOLUTION

## 2025-04-27 PROCEDURE — 96360 HYDRATION IV INFUSION INIT: CPT

## 2025-04-27 PROCEDURE — 25510000001 IOPAMIDOL 61 % SOLUTION

## 2025-04-27 RX ORDER — IOPAMIDOL 612 MG/ML
100 INJECTION, SOLUTION INTRAVASCULAR
Status: COMPLETED | OUTPATIENT
Start: 2025-04-27 | End: 2025-04-27

## 2025-04-27 RX ADMIN — IOPAMIDOL 100 ML: 612 INJECTION, SOLUTION INTRAVENOUS at 10:27

## 2025-04-27 RX ADMIN — SODIUM CHLORIDE 1000 ML: 9 INJECTION, SOLUTION INTRAVENOUS at 09:39

## 2025-04-27 NOTE — ED PROVIDER NOTES
Subjective   History of Present Illness  Patient comes from the nursing home with complaint of abdominal pain.  She is scheduled vague complaint of abdominal pain.  Does not seem to complain of pain when I push on her belly.  There is no rash on her abdomen.  She has good changes in the left buttocks on CAT scan.  I do not see anything on the skin to go along with that either.        Review of Systems   Gastrointestinal:  Positive for abdominal pain.       Past Medical History:   Diagnosis Date    COPD (chronic obstructive pulmonary disease)     History of CVA (cerebrovascular accident) 8/17/2020    History of seizures 8/17/2020    Intellectual disability 8/17/2020       No Known Allergies    History reviewed. No pertinent surgical history.    History reviewed. No pertinent family history.    Social History     Socioeconomic History    Marital status: Single   Tobacco Use    Smoking status: Never    Smokeless tobacco: Never           Objective   Physical Exam  HENT:      Head: Normocephalic.      Right Ear: External ear normal.      Left Ear: External ear normal.      Mouth/Throat:      Mouth: Mucous membranes are moist.   Eyes:      Extraocular Movements: Extraocular movements intact.      Pupils: Pupils are equal, round, and reactive to light.   Cardiovascular:      Rate and Rhythm: Normal rate and regular rhythm.      Heart sounds: No murmur heard.  Pulmonary:      Effort: Pulmonary effort is normal.      Breath sounds: Normal breath sounds.   Abdominal:      General: Abdomen is flat. Bowel sounds are normal.      Palpations: Abdomen is soft.   Musculoskeletal:         General: No swelling. Normal range of motion.      Cervical back: Normal range of motion. No rigidity.   Skin:     General: Skin is warm and dry.      Capillary Refill: Capillary refill takes less than 2 seconds.      Comments: Subcutaneous changes as seen on the CAT scan on the left buttocks.  I do not feel any masses in there.  I have looked  twice   Neurological:      General: No focal deficit present.      Mental Status: She is alert.   Psychiatric:         Mood and Affect: Mood normal.         Procedures           ED Course  ED Course as of 05/01/25 0959   Sun Apr 27, 2025   1056 White count is 8000 H&H is 14 and 44 platelets are 190.    Glucose is 172 with an alkaline phos of 135.    Urine has trace ketones.  Moderate leukocyte Estrace.  6-7 white cells. [GP]   Thu May 01, 2025   0957 No acute abnormality seen.     Left buttocks abnormality not fully seen. This may represent infection.  Repeat imaging if concerned through the pelvis.    [GP]      ED Course User Index  [GP] Gopi Salazar MD                                                       Medical Decision Making  60-year-old nursing home patient with developmental disorder complaints of abdominal pain.  No cause of abdominal pain is found.  Will get her back over to the nursing home.      Patient has leukocyte Estrace positive with 5-10 white cells which is not that much.  Will monitor.    Changes seen on CAT Scan.  Don't see changes in the skin on the patient.      Problems Addressed:  Generalized abdominal pain: complicated acute illness or injury    Amount and/or Complexity of Data Reviewed  Labs: ordered.  Radiology: ordered.    Risk  Prescription drug management.        Final diagnoses:   Generalized abdominal pain       ED Disposition  ED Disposition       ED Disposition   Discharge    Condition   Stable    Comment   --               Alonso Mendez MD  [address] [place] [address] [id number] [phone]      As needed   Cause for Abd pain not found         Medication List      No changes were made to your prescriptions during this visit.            Gopi Salazar MD  04/27/25 1110       Gopi Salazar MD  04/27/25 1111       Gopi Salazar MD  05/01/25 0959

## 2025-04-30 LAB — BACTERIA SPEC AEROBE CULT: ABNORMAL
